# Patient Record
Sex: FEMALE | Race: BLACK OR AFRICAN AMERICAN | Employment: OTHER | URBAN - METROPOLITAN AREA
[De-identification: names, ages, dates, MRNs, and addresses within clinical notes are randomized per-mention and may not be internally consistent; named-entity substitution may affect disease eponyms.]

---

## 2020-12-07 ENCOUNTER — OFFICE VISIT (OUTPATIENT)
Dept: ORTHOPEDIC SURGERY | Age: 75
End: 2020-12-07
Payer: MEDICARE

## 2020-12-07 VITALS
DIASTOLIC BLOOD PRESSURE: 85 MMHG | WEIGHT: 200 LBS | TEMPERATURE: 97.7 F | RESPIRATION RATE: 15 BRPM | HEART RATE: 82 BPM | SYSTOLIC BLOOD PRESSURE: 170 MMHG

## 2020-12-07 DIAGNOSIS — M43.16 SPONDYLOLISTHESIS AT L4-L5 LEVEL: Primary | ICD-10-CM

## 2020-12-07 DIAGNOSIS — G95.19 NEUROGENIC CLAUDICATION (HCC): ICD-10-CM

## 2020-12-07 PROCEDURE — 99203 OFFICE O/P NEW LOW 30 MIN: CPT | Performed by: PHYSICAL MEDICINE & REHABILITATION

## 2020-12-07 PROCEDURE — G8536 NO DOC ELDER MAL SCRN: HCPCS | Performed by: PHYSICAL MEDICINE & REHABILITATION

## 2020-12-07 PROCEDURE — 72110 X-RAY EXAM L-2 SPINE 4/>VWS: CPT | Performed by: PHYSICAL MEDICINE & REHABILITATION

## 2020-12-07 PROCEDURE — G8427 DOCREV CUR MEDS BY ELIG CLIN: HCPCS | Performed by: PHYSICAL MEDICINE & REHABILITATION

## 2020-12-07 PROCEDURE — G8421 BMI NOT CALCULATED: HCPCS | Performed by: PHYSICAL MEDICINE & REHABILITATION

## 2020-12-07 PROCEDURE — G8399 PT W/DXA RESULTS DOCUMENT: HCPCS | Performed by: PHYSICAL MEDICINE & REHABILITATION

## 2020-12-07 PROCEDURE — G8510 SCR DEP NEG, NO PLAN REQD: HCPCS | Performed by: PHYSICAL MEDICINE & REHABILITATION

## 2020-12-07 PROCEDURE — 1090F PRES/ABSN URINE INCON ASSESS: CPT | Performed by: PHYSICAL MEDICINE & REHABILITATION

## 2020-12-07 PROCEDURE — 1101F PT FALLS ASSESS-DOCD LE1/YR: CPT | Performed by: PHYSICAL MEDICINE & REHABILITATION

## 2020-12-07 RX ORDER — TELMISARTAN AND HYDROCHLORTHIAZIDE 80; 12.5 MG/1; MG/1
TABLET ORAL
COMMUNITY
Start: 2020-09-26

## 2020-12-07 RX ORDER — TRAMADOL HYDROCHLORIDE 50 MG/1
TABLET ORAL
COMMUNITY
Start: 2020-11-28 | End: 2021-06-15

## 2020-12-07 RX ORDER — ERGOCALCIFEROL 1.25 MG/1
CAPSULE ORAL
COMMUNITY
Start: 2020-09-09

## 2020-12-07 RX ORDER — ROSUVASTATIN CALCIUM 5 MG/1
5 TABLET, COATED ORAL
COMMUNITY
Start: 2020-12-04

## 2020-12-07 RX ORDER — GABAPENTIN 300 MG/1
CAPSULE ORAL
COMMUNITY
Start: 2020-12-02 | End: 2020-12-07 | Stop reason: DRUGHIGH

## 2020-12-07 RX ORDER — GABAPENTIN 400 MG/1
CAPSULE ORAL
Qty: 90 CAP | Refills: 2 | Status: SHIPPED | OUTPATIENT
Start: 2020-12-07 | End: 2021-03-15 | Stop reason: SDUPTHER

## 2020-12-07 RX ORDER — CELECOXIB 100 MG/1
CAPSULE ORAL
COMMUNITY
Start: 2020-10-20 | End: 2021-06-15

## 2020-12-07 NOTE — PROGRESS NOTES
[address]  [address], [address]  [place] [address]  Phone: [phone]  Fax: [phone]        Adriana Bergman  : 1945  PCP: Leticia Jeff MD      NEW PATIENT      ASSESSMENT AND PLAN     Diagnoses and all orders for this visit:    1. Spondylolisthesis at L4-L5 level  -     AMB POC XRAY, SPINE, LUMBOSACRAL; 4+  -     AMB SUPPLY ORDER  -     gabapentin (NEURONTIN) 400 mg capsule; 1 cap po Q am and 2 cap po Q HS    2. Neurogenic claudication  -     AMB SUPPLY ORDER  -     gabapentin (NEURONTIN) 400 mg capsule; 1 cap po Q am and 2 cap po Q HS      1. 66 y.o. female with a spondylolisthesis and progressive ambulatory dysfunction due to probable lumbar stenosis. 2. Advised to stay active as tolerated. 3. Discussed life style modification, PT, medication, spinal injection, and surgery as treatment options  4. Rx for back brace, intermittent use for flares and prolonged ambulation. 5. Increased Gabapentin to 400/800 mg  6. Given information on back care basics and spondylolisthesis    Patient was prescribed a back brace which is medically necessary for the above diagnoses. This orthosis is required for the following reason(s):   1. To reduce pain by restrictions mobility of the trunk - YES,           Follow-up and Dispositions    · Return in 1 month (on 2021). CHIEF COMPLAINT  Hiwot Juarez is seen today in consultation for complaints of back and B/L buttock pain       HISTORY OF PRESENT ILLNESS  Hiwot Juarez is a 66 y.o. female. Today pt c/o back and B/L buttock pain since 2018. Pt denies any specific incident or injury that caused their pain. Pt states that in 2018, she was told she had arthritis, and because she didn't want surgery, she was given Gabapentin and Tramadol. Notes that around that time, her grandson graduated from college, and she over exerted herself. She believes this caused the flare.  Walking tolerance with a shopping cart is manageable. Standing tolerance is now 10 minutes. Pain Assessment  12/7/2020   Location of Pain Back   Severity of Pain 6   Quality of Pain Aching   Frequency of Pain Intermittent   Aggravating Factors Standing;Walking   Aggravating Factors Comment lifting   Relieving Factors Heat;Rest;Exercises     Does pain radiate into extremities: B/L buttock  Does patient have numbness/tingling: No  Does patient have weakness: No   Pt denies saddle paresthesias. Denies persistent fevers, chills, weight changes, neurogenic bowel or bladder symptoms. Treatments patient has tried:  Physical therapy:Yes 2018 w/ benefit  Doing HEP: Yes  Beneficial medications: Celebrex, Gabapentin 300/600 mg, Tramadol 50 mg as needed  Failed medications: Unknown  Spinal injections: No    Spinal surgery- No.   Spine surgery consult: No.     L XR 12/2020 (in-office): grade 1-2 listhesis at L4-5, DDD at L5-S1     reviewed. PMHx of HTN. Used to work in accounting. PAST MEDICAL HISTORY   Past Medical History:   Diagnosis Date    Hypertension        Past Surgical History:   Procedure Laterality Date    HX TUBAL LIGATION         MEDICATIONS        Controlled Substance Monitoring:    No flowsheet data found.      ALLERGIES  Not on File       SOCIAL HISTORY    Social History     Socioeconomic History    Marital status:      Spouse name: Not on file    Number of children: Not on file    Years of education: Not on file    Highest education level: Not on file   Occupational History    Not on file   Social Needs    Financial resource strain: Not on file    Food insecurity     Worry: Not on file     Inability: Not on file    Transportation needs     Medical: Not on file     Non-medical: Not on file   Tobacco Use    Smoking status: Never Smoker    Smokeless tobacco: Never Used   Substance and Sexual Activity    Alcohol use: Not on file    Drug use: Not on file    Sexual activity: Not on file   Lifestyle  Physical activity     Days per week: Not on file     Minutes per session: Not on file    Stress: Not on file   Relationships    Social connections     Talks on phone: Not on file     Gets together: Not on file     Attends Latter day service: Not on file     Active member of club or organization: Not on file     Attends meetings of clubs or organizations: Not on file     Relationship status: Not on file    Intimate partner violence     Fear of current or ex partner: Not on file     Emotionally abused: Not on file     Physically abused: Not on file     Forced sexual activity: Not on file   Other Topics Concern    Not on file   Social History Narrative    Not on file       FAMILY HISTORY  History reviewed. No pertinent family history. REVIEW OF SYSTEMS  Review of Systems   Constitutional: Negative for chills, fever and weight loss. Respiratory: Negative for shortness of breath. Cardiovascular: Negative for chest pain. Gastrointestinal: Negative for constipation. Negative for fecal incontinence   Genitourinary: Negative for dysuria. Negative for urinary incontinence   Musculoskeletal: Positive for back pain. Per HPI   Skin: Negative for rash. Neurological: Negative for dizziness, tingling, tremors, focal weakness and headaches. Endo/Heme/Allergies: Does not bruise/bleed easily. Psychiatric/Behavioral: The patient does not have insomnia. PHYSICAL EXAMINATION  Visit Vitals  BP (!) 170/85   Pulse 82   Temp 97.7 °F (36.5 °C) (Temporal)   Resp 15   Wt 200 lb (90.7 kg)          Accompanied by self. Constitutional:  Well developed, well nourished, in no acute distress. Psychiatric: Affect and mood are appropriate. Integumentary: No rashes or abrasions noted on exposed areas. Cardiovascular/Peripheral Vascular: No peripheral edema is noted BLE. SPINE/MUSCULOSKELETAL EXAM    Lumbar spine:  No rash, ecchymosis, or gross obliquity. No fasciculations.  No focal atrophy is noted. Tenderness to palpation L4-5, B/L sciatic notch. SI joints non-tender. Trochanters non tender. MOTOR:       Hip Flex  Quads Hamstrings Ankle DF EHL Ankle PF   Right +4/5 +4/5 +4/5 +4/5 +4/5 +4/5   Left +4/5 +4/5 +4/5 +4/5 +4/5 +4/5     Straight Leg raise negative. Ambulation without assistive device. FWB. RADIOGRAPHS  L xray films reviewed:  1) grade 1-2 listhesis at L4-5  2) DDD L5-S1    Written by Marisol Echeverria, as dictated by Kai Villegas MD.    I, Dr. Kai Villegas MD, confirm that all documentation is accurate. Ms. Marianne Cruz may have a reminder for a \"due or due soon\" health maintenance. I have asked that she contact her primary care provider for follow-up on this health maintenance.

## 2020-12-07 NOTE — PATIENT INSTRUCTIONS
Learning About How to Have a Healthy Back  What causes back pain? Back pain is often caused by overuse, strain, or injury. For example, people often hurt their backs playing sports or working in the yard, being jolted in a car accident, or lifting something too heavy. Aging plays a part too. Your bones and muscles tend to lose strength as you age, which makes injury more likely. The spongy discs between the bones of the spine (vertebrae) may suffer from wear and tear and no longer provide enough cushion between the bones. A disc that bulges or breaks open (herniated disc) can press on nerves, causing back pain. In some people, back pain is the result of arthritis, broken vertebrae caused by bone loss (osteoporosis), illness, or a spine problem. Although most people have back pain at one time or another, there are steps you can take to make it less likely. How can you have a healthy back? Reduce stress on your back through good posture   Slumping or slouching alone may not cause low back pain. But after the back has been strained or injured, bad posture can make pain worse. · Sleep in a position that maintains your back's normal curves and on a mattress that feels comfortable. Sleep on your side with a pillow between your knees, or sleep on your back with a pillow under your knees. These positions can reduce strain on your back. · Stand and sit up straight. \"Good posture\" generally means your ears, shoulders, and hips are in a straight line. · If you must stand for a long time, put one foot on a stool, ledge, or box. Switch feet every now and then. · Sit in a chair that is low enough to let you place both feet flat on the floor with both knees nearly level with your hips. If your chair or desk is too high, use a footrest to raise your knees. Place a small pillow, a rolled-up towel, or a lumbar roll in the curve of your back if you need extra support.   · Try a kneeling chair, which helps tilt your hips forward. This takes pressure off your lower back. · Try sitting on an exercise ball. It can rock from side to side, which helps keep your back loose. · When driving, keep your knees nearly level with your hips. Sit straight, and drive with both hands on the steering wheel. Your arms should be in a slightly bent position. Reduce stress on your back through careful lifting   · Squat down, bending at the hips and knees only. If you need to, put one knee to the floor and extend your other knee in front of you, bent at a right angle (half kneeling). · Press your chest straight forward. This helps keep your upper back straight while keeping a slight arch in your low back. · Hold the load as close to your body as possible, at the level of your belly button (navel). · Use your feet to change direction, taking small steps. · Lead with your hips as you change direction. Keep your shoulders in line with your hips as you move. · Set down your load carefully, squatting with your knees and hips only. Exercise and stretch your back   · Do some exercise on most days of the week, if your doctor says it is okay. You can walk, run, swim, or cycle. · Stretch your back muscles. Here are a few exercises to try:  ? Lie on your back, and gently pull one bent knee to your chest. Put that foot back on the floor, and then pull the other knee to your chest.  ? Do pelvic tilts. Lie on your back with your knees bent. Tighten your stomach muscles. Pull your belly button (navel) in and up toward your ribs. You should feel like your back is pressing to the floor and your hips and pelvis are slightly lifting off the floor. Hold for 6 seconds while breathing smoothly. ? Sit with your back flat against a wall. · Keep your core muscles strong. The muscles of your back, belly (abdomen), and buttocks support your spine. ? Pull in your belly and imagine pulling your navel toward your spine. Hold this for 6 seconds, then relax.  Remember to keep breathing normally as you tense your muscles. ? Do curl-ups. Always do them with your knees bent. Keep your low back on the floor, and curl your shoulders toward your knees using a smooth, slow motion. Keep your arms folded across your chest. If this bothers your neck, try putting your hands behind your neck (not your head), with your elbows spread apart. ? Lie on your back with your knees bent and your feet flat on the floor. Tighten your belly muscles, and then push with your feet and raise your buttocks up a few inches. Hold this position 6 seconds as you continue to breathe normally, then lower yourself slowly to the floor. Repeat 8 to 12 times. ? If you like group exercise, try Pilates or yoga. These classes have poses that strengthen the core muscles. Lead a healthy lifestyle   · Stay at a healthy weight to avoid strain on your back. · Do not smoke. Smoking increases the risk of osteoporosis, which weakens the spine. If you need help quitting, talk to your doctor about stop-smoking programs and medicines. These can increase your chances of quitting for good. Where can you learn more? Go to http://www.east.com/  Enter L315 in the search box to learn more about \"Learning About How to Have a Healthy Back. \"  Current as of: March 2, 2020               Content Version: 12.6  © 1920-2732 [employer]. Care instructions adapted under license by [employer] (which disclaims liability or warranty for this information). If you have questions about a medical condition or this instruction, always ask your healthcare professional. [place] [address] any warranty or liability for your use of this information. Spondylolysis and Spondylolisthesis: Exercises  Introduction  Here are some examples of exercises for you to try. The exercises may be suggested for a condition or for rehabilitation. Start each exercise slowly.  Ease off the exercises if you start to have pain. You will be told when to start these exercises and which ones will work best for you. How to do the exercises  Single knee-to-chest   1. Lie on your back with your knees bent and your feet flat on the floor. You can put a small pillow under your head and neck if it is more comfortable. 2. Bring one knee to your chest, keeping the other foot flat on the floor. 3. Keep your lower back pressed to the floor. Hold for 15 to 30 seconds. 4. Relax, and lower the knee to the starting position. 5. Repeat with the other leg. Repeat 2 to 4 times with each leg. 6. To get more stretch, put your other leg flat on the floor while pulling your knee to your chest.    Double knee-to-chest   1. Lie on your back with your knees bent and your feet flat on the floor. You can put a small pillow under your head and neck if it is more comfortable. 2. Bring both knees to your chest.  3. Keep your lower back pressed to the floor. Hold for 15 to 30 seconds. 4. Relax, and lower your knees to the starting position. 5. Repeat 2 to 4 times. Alternate arm and leg (bird dog) exercise   Do this exercise slowly. Try to keep your body straight at all times. 1. Start on the floor, on your hands and knees. 2. Tighten your belly muscles by pulling your belly button in toward your spine. Be sure you continue to breathe normally and do not hold your breath. 3. Raise one arm off the floor, and hold it straight out in front of you. Be careful not to let your shoulder drop down, because that will twist your trunk. 4. Hold for about 6 seconds, then lower your arm and switch to your other arm. 5. Repeat 8 to 12 times on each arm. 6. When you can do this exercise with ease and no pain, repeat steps 1 through 5. But this time do it with one leg raised off the floor, holding your leg straight out behind you. Be careful not to let your hip drop down, because that will twist your trunk.   7. When holding your leg straight out becomes easier, try raising your opposite arm at the same time, and repeat steps 1 through 5. Bridging   1. Lie on your back with both knees bent. Your knees should be bent about 90 degrees. 2. Then push your feet into the floor, squeeze your buttocks, and lift your hips off the floor until your shoulders, hips, and knees are all in a straight line. 3. Hold for about 6 seconds as you continue to breathe normally, and then slowly lower your hips back down to the floor and rest for up to 10 seconds. 4. Repeat 8 to 12 times. Curl-ups   1. Lie on the floor on your back with your knees bent at a 90-degree angle. Your feet should be flat on the floor, about 12 inches from your buttocks. 2. Cross your arms over your chest. If this bothers your neck, try putting your hands behind your neck (not your head), with your elbows spread apart. 3. Slowly tighten your belly muscles and raise your shoulder blades off the floor. 4. Keep your head in line with your body, and do not press your chin to your chest.  5. Hold this position for 1 or 2 seconds, then slowly lower yourself back down to the floor. 6. Repeat 8 to 12 times. Plank   Do this exercise slowly. Try to keep your body straight at all times, and do not let one hip drop lower than the other. 1. Lie on your stomach, resting your upper body on your forearms. 2. Tighten your belly muscles by pulling your belly button in toward your spine. 3. Keeping your knees on the floor, press down with your forearms to lift your upper body off the floor. 4. Hold for about 6 seconds, then lower your body to the floor. Rest for up to 10 seconds. 5. Repeat 8 to 12 times. 6. Over time, work up to holding for 15 to 30 seconds each time. 7. If this exercise is easy to do with your knees on the floor, try doing this exercise with your knees and legs straight, supported by your toes on the floor. Follow-up care is a key part of your treatment and safety.  Be sure to make and go to all appointments, and call your doctor if you are having problems. It's also a good idea to know your test results and keep a list of the medicines you take. Where can you learn more? Go to http://www.east.com/  Enter M245 in the search box to learn more about \"Spondylolysis and Spondylolisthesis: Exercises. \"  Current as of: March 2, 2020               Content Version: 12.6  © 2006-2020 [employer]. Care instructions adapted under license by [employer] (which disclaims liability or warranty for this information). If you have questions about a medical condition or this instruction, always ask your healthcare professional. [address] any warranty or liability for your use of this information.

## 2021-03-11 DIAGNOSIS — G95.19 NEUROGENIC CLAUDICATION (HCC): ICD-10-CM

## 2021-03-11 DIAGNOSIS — M43.16 SPONDYLOLISTHESIS AT L4-L5 LEVEL: ICD-10-CM

## 2021-03-11 RX ORDER — GABAPENTIN 400 MG/1
CAPSULE ORAL
Qty: 90 CAP | Refills: 2 | OUTPATIENT
Start: 2021-03-11

## 2021-03-11 NOTE — TELEPHONE ENCOUNTER
Last Visit: 20 with MD Tam Quick  Next Appointment: Advised to follow-up in 1 month  Previous Refill Encounter(s): 20 #90 with 2 refills    Requested Prescriptions     Pending Prescriptions Disp Refills    gabapentin (NEURONTIN) 400 mg capsule 90 Cap 2     Si cap po Q am and 2 cap po Q HS

## 2021-03-15 ENCOUNTER — OFFICE VISIT (OUTPATIENT)
Dept: ORTHOPEDIC SURGERY | Age: 76
End: 2021-03-15
Payer: MEDICARE

## 2021-03-15 VITALS
HEART RATE: 95 BPM | HEIGHT: 65 IN | WEIGHT: 202 LBS | DIASTOLIC BLOOD PRESSURE: 77 MMHG | OXYGEN SATURATION: 98 % | BODY MASS INDEX: 33.66 KG/M2 | TEMPERATURE: 98.8 F | SYSTOLIC BLOOD PRESSURE: 155 MMHG

## 2021-03-15 DIAGNOSIS — M43.16 SPONDYLOLISTHESIS AT L4-L5 LEVEL: ICD-10-CM

## 2021-03-15 DIAGNOSIS — G95.19 NEUROGENIC CLAUDICATION (HCC): ICD-10-CM

## 2021-03-15 PROCEDURE — G8427 DOCREV CUR MEDS BY ELIG CLIN: HCPCS | Performed by: NURSE PRACTITIONER

## 2021-03-15 PROCEDURE — 99213 OFFICE O/P EST LOW 20 MIN: CPT | Performed by: NURSE PRACTITIONER

## 2021-03-15 PROCEDURE — G8536 NO DOC ELDER MAL SCRN: HCPCS | Performed by: NURSE PRACTITIONER

## 2021-03-15 PROCEDURE — G8399 PT W/DXA RESULTS DOCUMENT: HCPCS | Performed by: NURSE PRACTITIONER

## 2021-03-15 PROCEDURE — G8432 DEP SCR NOT DOC, RNG: HCPCS | Performed by: NURSE PRACTITIONER

## 2021-03-15 PROCEDURE — G8417 CALC BMI ABV UP PARAM F/U: HCPCS | Performed by: NURSE PRACTITIONER

## 2021-03-15 PROCEDURE — 1101F PT FALLS ASSESS-DOCD LE1/YR: CPT | Performed by: NURSE PRACTITIONER

## 2021-03-15 PROCEDURE — 1090F PRES/ABSN URINE INCON ASSESS: CPT | Performed by: NURSE PRACTITIONER

## 2021-03-15 RX ORDER — GABAPENTIN 400 MG/1
CAPSULE ORAL
Qty: 90 CAP | Refills: 2 | Status: SHIPPED | OUTPATIENT
Start: 2021-03-15 | End: 2021-06-15 | Stop reason: SDUPTHER

## 2021-03-15 NOTE — PROGRESS NOTES
Chief complaint   Chief Complaint   Patient presents with    Back Pain    Medication Refill       History of Present Illness: Bennie Shirley is a  66 y.o.  female who comes in today for follow-up of her chronic low back pain. She states she does get some bilateral buttock pain and that gabapentin does help with it. Dr. Damian Esquivel put her on 400 in the morning and 800 at night which was working well for her. She states her PCP change it to 300 mg three times a day but then will not refill it as he has referred her to pain management. She does have a pain management appointment this Thursday. She is not quite sure who it is with. The PCP also gives her small amounts of tramadol. She states she would like to go back on the gabapentin 400 in the morning and 800 mg at nighttime as that works better for her. She denies fever bowel bladder dysfunction. She does have a known spondylolisthesis at L4-5. Dr. Jackelyn Estrella also gave her a back brace that she uses when she does heavy cleaning and that does seem to help with her back pain. She denies fever bowel bladder dysfunction. Physical Exam: Patient is a 64-year-old female well-developed well-nourished who is alert and oriented with a normal mood and affect. She has a full weightbearing slow but nonantalgic gait. She did not use any assistive device. She has 4 out of 5 strength bilateral lower extremities. Negative straight leg raise. She has pain with hyperextension lumbar spine. Assessment and Plan: This patient with a spondylolisthesis at L4-5 that gives her chronic back and buttock pain. I am going to put her back on the gabapentin 400 in the morning and 800 at night. We will see her back in 3-month sooner if needed.         Review of systems:    Past Medical History:   Diagnosis Date    Hypertension      Past Surgical History:   Procedure Laterality Date    HX TUBAL LIGATION       Social History     Socioeconomic History    Marital status:      Spouse name: Not on file    Number of children: Not on file    Years of education: Not on file    Highest education level: Not on file   Occupational History    Not on file   Social Needs    Financial resource strain: Not on file    Food insecurity     Worry: Not on file     Inability: Not on file    Transportation needs     Medical: Not on file     Non-medical: Not on file   Tobacco Use    Smoking status: Never Smoker    Smokeless tobacco: Never Used   Substance and Sexual Activity    Alcohol use: Not on file    Drug use: Not on file    Sexual activity: Not on file   Lifestyle    Physical activity     Days per week: Not on file     Minutes per session: Not on file    Stress: Not on file   Relationships    Social connections     Talks on phone: Not on file     Gets together: Not on file     Attends Mandaeism service: Not on file     Active member of club or organization: Not on file     Attends meetings of clubs or organizations: Not on file     Relationship status: Not on file    Intimate partner violence     Fear of current or ex partner: Not on file     Emotionally abused: Not on file     Physically abused: Not on file     Forced sexual activity: Not on file   Other Topics Concern    Not on file   Social History Narrative    Not on file     No family history on file. Physical Exam:  Visit Vitals  BP (!) 155/77 (BP 1 Location: Left upper arm, BP Patient Position: Sitting, BP Cuff Size: Adult long)   Pulse 95   Temp 98.8 °F (37.1 °C) (Skin)   Ht 5' 5\" (1.651 m)   Wt 202 lb (91.6 kg)   SpO2 98%   BMI 33.61 kg/m²     Pain Scale: 5/10       has been . reviewed and is appropriate          Diagnoses and all orders for this visit:    1. Spondylolisthesis at L4-L5 level  -     gabapentin (NEURONTIN) 400 mg capsule; 1 cap po Q am and 2 cap po Q HS    2. Neurogenic claudication            Follow-up and Dispositions    · Return in about 3 months (around 6/15/2021) for with NP Larissa.              We have informed [facility] to notify us for immediate appointment if she has any worsening neurogical symptoms or if an emergency situation presents, then call [phone]

## 2021-06-15 ENCOUNTER — OFFICE VISIT (OUTPATIENT)
Dept: ORTHOPEDIC SURGERY | Age: 76
End: 2021-06-15
Payer: MEDICARE

## 2021-06-15 VITALS
BODY MASS INDEX: 34.32 KG/M2 | DIASTOLIC BLOOD PRESSURE: 72 MMHG | TEMPERATURE: 96.9 F | SYSTOLIC BLOOD PRESSURE: 145 MMHG | HEART RATE: 70 BPM | OXYGEN SATURATION: 99 % | WEIGHT: 206 LBS | HEIGHT: 65 IN

## 2021-06-15 DIAGNOSIS — M54.41 CHRONIC BILATERAL LOW BACK PAIN WITH BILATERAL SCIATICA: ICD-10-CM

## 2021-06-15 DIAGNOSIS — M54.42 CHRONIC BILATERAL LOW BACK PAIN WITH BILATERAL SCIATICA: ICD-10-CM

## 2021-06-15 DIAGNOSIS — M25.562 CHRONIC PAIN OF BOTH KNEES: ICD-10-CM

## 2021-06-15 DIAGNOSIS — Z51.81 ENCOUNTER FOR THERAPEUTIC DRUG MONITORING: ICD-10-CM

## 2021-06-15 DIAGNOSIS — G89.29 CHRONIC BILATERAL LOW BACK PAIN WITH BILATERAL SCIATICA: ICD-10-CM

## 2021-06-15 DIAGNOSIS — M43.16 SPONDYLOLISTHESIS AT L4-L5 LEVEL: Primary | ICD-10-CM

## 2021-06-15 DIAGNOSIS — G89.29 CHRONIC PAIN OF BOTH KNEES: ICD-10-CM

## 2021-06-15 DIAGNOSIS — M25.561 CHRONIC PAIN OF BOTH KNEES: ICD-10-CM

## 2021-06-15 DIAGNOSIS — M43.16 SPONDYLOLISTHESIS AT L4-L5 LEVEL: ICD-10-CM

## 2021-06-15 PROCEDURE — 1090F PRES/ABSN URINE INCON ASSESS: CPT | Performed by: NURSE PRACTITIONER

## 2021-06-15 PROCEDURE — G8399 PT W/DXA RESULTS DOCUMENT: HCPCS | Performed by: NURSE PRACTITIONER

## 2021-06-15 PROCEDURE — G8432 DEP SCR NOT DOC, RNG: HCPCS | Performed by: NURSE PRACTITIONER

## 2021-06-15 PROCEDURE — G8417 CALC BMI ABV UP PARAM F/U: HCPCS | Performed by: NURSE PRACTITIONER

## 2021-06-15 PROCEDURE — G8536 NO DOC ELDER MAL SCRN: HCPCS | Performed by: NURSE PRACTITIONER

## 2021-06-15 PROCEDURE — 1101F PT FALLS ASSESS-DOCD LE1/YR: CPT | Performed by: NURSE PRACTITIONER

## 2021-06-15 PROCEDURE — G8427 DOCREV CUR MEDS BY ELIG CLIN: HCPCS | Performed by: NURSE PRACTITIONER

## 2021-06-15 PROCEDURE — 99213 OFFICE O/P EST LOW 20 MIN: CPT | Performed by: NURSE PRACTITIONER

## 2021-06-15 RX ORDER — GABAPENTIN 400 MG/1
CAPSULE ORAL
Qty: 90 CAPSULE | Refills: 2 | Status: SHIPPED | OUTPATIENT
Start: 2021-06-15 | End: 2021-09-15 | Stop reason: SDUPTHER

## 2021-06-15 RX ORDER — AMLODIPINE BESYLATE 5 MG/1
TABLET ORAL
COMMUNITY
Start: 2021-05-28 | End: 2022-05-03 | Stop reason: ALTCHOICE

## 2021-06-15 RX ORDER — SITAGLIPTIN AND METFORMIN HYDROCHLORIDE 50; 500 MG/1; MG/1
TABLET, FILM COATED, EXTENDED RELEASE ORAL
COMMUNITY
End: 2022-01-31

## 2021-06-15 RX ORDER — TRAMADOL HYDROCHLORIDE 50 MG/1
50 TABLET ORAL
Qty: 30 TABLET | Refills: 0 | Status: SHIPPED | OUTPATIENT
Start: 2021-06-15 | End: 2021-07-19

## 2021-06-15 NOTE — LETTER
Name:.Lissette Ellis CSA:[birth year] MR #:[id number] Office:[facility] Page 1 of 5 CONTROLLED SUBSTANCE AGREEMENT I may be prescribed medications that are controlled substances as part  of my treatment plan for management of my medical condition(s). The goal of my treatment plan is to maintain and/or improve my health and wellbeing. Because controlled substances have an increased risk of abuse or harm, continual re-evaluation is needed determine if the goals of my treatment plan are being met for my safety and the safety of others. Macario Kendrick  am entering into this Controlled Substance Agreement with my provider, Sachi King, NP__at [facility] . I understand that successful treatment requires mutual trust and honesty between me and my provider. I understand that there are state and federal laws and regulations which apply to the medications that my provider may prescribe that must be followed. I understand there are risks and benefits ts of taking the medicines that my provider may prescribe. I understand and agree that following this Agreement is necessary in continuing my provider-patient relationship and success of my treatment plan. As a part of my treatment plan, I agree to the following: COMMUNICATION: 
 
1. I will communicate fully with my provider about my medical condition(s), including the effect on my daily life and how well my medications are helping. I will tell my provider all of the medications that I take for any reason, including medications I receive from another health care provider, and will notify my provider about all issues, problems or concerns, including any side effects, which may be related to my medications. I understand that this information allows my provider to adjust my treatment plan to help manage my medical condition.  I understand that this information will become part of my permanent medical record. 2. I will notify my provider if I have a history of alcohol/drug misuse/addiction or if I have had treatment for alcohol/drug addiction in the past, or if I have a new problem with or concern about alcohol/drug use/addiction, because this increases the likelihood of high risk behaviors and may lead to serious medical conditions. 3. Females Only: I will notify my provider if I am or become pregnant, or if I intend to become pregnant, or if I intend to breastfeed. I understand that communication of these issues with my provider is important, due to possible effects my medication could have on an unborn fetus or breastfeeding child. Initials_____ Name:Leilani Rosas HRE:[birth year] MR #:[id number] Office:[facility] Page 2 of 5 MISUSE OF MEDICATIONS / DRUGS: 
 
1. I agree to take all controlled substances as prescribed, and will not misuse or abuse any controlled substances prescribed by my provider. For my safety, I will not increase the amount of medicine I take without first talking with and getting permission from my provider. 2. If I have a medical emergency, another health care provider may prescribe me medication. If I seek emergency treatment, I will notify my provider within seventy-two (72) hours. 3. I understand that my provider may discuss my use and/or possible misuse/abuse of controlled substances and alcohol, as appropriate, with any health care provider involved in my care, pharmacist or legal authority. ILLEGAL DRUGS: 
 
1. I will not use illegal drugs of any kind, including but not limited to marijuana, heroin, cocaine, or any prescription drug which is not prescribed to me. DRUG DIVERSION / PRESCRIPTION FRAUD: 
 
1. I will not share, sell, trade, give away, or otherwise misuse my prescriptions or medications. 2. I will not alter any prescriptions provided to me by my provider. SINGLE PROVIDER: 
 
1. I agree that all controlled substances that I take will be prescribed only by my provider (or his/her covering provider) under this Agreement. This agreement does not prevent me from seeking emergency medical treatment or receiving pain management related to a surgery. PROTECTING MEDICATIONS: 
 
1. I am responsible for keeping my prescriptions and medications in a safe and secure place including safeguarding them from loss or theft. I understand that lost, stolen or damaged/destroyed prescriptions or medications will not be replaced. Initials____ Name:Leilani Tim JOT:0[birth year] MR #:[id number] Office:[facility] Page 3 of 5 PRESCRIPTION RENEWALS/REFILLS: 
 
1. I will follow my controlled substance medication schedule as prescribed by my provider. 2. I understand and agree that I will make any requests for renewals or refills of my prescriptions only at the time of an office visit or during my providers regular office hours subject to the prescription refill requirements of the individual practice. 3. I understand that my provider may not call in prescriptions for controlled substances to my pharmacy. 4. I understand that my provider may adjust or discontinue these medications as deemed appropriate for my medical treatment plan. This Agreement does not guarantee the prescription of controlled medications. 5. I agree that if my medications are adjusted or discontinued, I will properly dispose of any remaining medications. I understand that I will be required to dispose of any remaining controlled medications prior to being provided with any prescriptions for other controlled medications. 6. I understand that the renewal of my prescription depends on my medical condition, my consistent participation, and my adherence with my treatment plan and this Agreement.  
 
7. I understand that if I do not keep an appointment with my provider, I may not receive a renewal or refill for my controlled substance medication. PRESCRIPTION MONITORING / DRUG TESTIN. I understand that my provider may require me to provide urine, saliva or blood for testing at any time. I understand that this testing will be used to monitor for safety and adherence with my treatment plan and this Agreement. 2. I understand that my provider may ask me to provide an observed urine specimen, which means that a nurse or other health care provider may watch me provide urine, and I agree to cooperate if I am asked to provide an observed specimen. 3. I understand that if I do not provide urine, saliva or blood samples within two (2) hours of my providers request, or other timeframe decided by my provider, my treatment plan could be changed, or my prescriptions and medications may be changed or ended. 4. I understand that urine, saliva and blood test results will be a part of my permanent medical record. Initials_____ Name:Leilani [place]: MR #:[id number] Office:[facility] Page 4 of 5 
 
5. I understand that my provider is required to obtain a copy of my State Prescription Monitoring Program () Report at any time in order to safely prescribe medications. 6. I will bring all of my prescribed controlled substance medications in their original bottles to all of my scheduled appointments. 7. I understand that my provider may ask me to come to the practice with all of my prescribed medications for a random pill count at any time. I agree to cooperate if I am asked to come in for a random pill count. I understand that if I do not arrive in the timeframe decided by my provider, my treatment plan could be changed, or my prescriptions and medications may be changed or ended.  
 
COOPERATION WITH INVESTIGATIONS: 
 
1. I authorize my provider and my pharmacy to cooperate fully with any local, state, or federal law enforcement agency in the investigation of any possible misuse, sale, or other diversion of my controlled substance prescriptions or medications. RISKS: 
 
1. I understand that my level of consciousness may be affected from the use of controlled substances, and I understand that there are risks, benefits, effects and potential alternatives (including no treatment) to the medications that my provider has prescribed. 2. I understand that I may become drowsy, tired, dizzy, constipated, and sick to my stomach, or have changes in my mood or in my sleep while taking my medications. I have talked with my provider about these possible side effects, risks, benefits, and alternative treatments, and my provider has answered all of my questions. 3. I understand that I should not suddenly stop taking my medications without first speaking with my provider. I understand that if I suddenly stop taking my medications, I may experience nausea, vomiting, sweating,anxiety, sleeplessness, itching or other uncomfortable feelings. 4. I will not take my medications with alcohol of any kind, including beer, wine or liquor. I understand that drinking alcohol with my medications increases the chances of side effects, including breathing problems or even death. 5. I understand that if I have a history of alcoholism or other drug addiction I may be at increased risk of addiction to my medications. Signs of addiction might include craving, compulsive use, and continued use despite harm. Since addiction is a disease, I understand my provider may decide to change my medications and refer me to appropriate treatment services. I understand that this information would become part of my permanent medical record. Initials_____ Name:Leilani Peterson VLW:[birth year] MR #:[id number] Office:[facility] Page 5 of 5  
 
 
6.  Females only: Children born to women who regularly take controlled substances are likely to have physical problems and suffer withdrawal symptoms at birth. If I am of child-bearing age, I understand that I should use safe and effective birth control while taking any controlled substances to avoid the impact of medications on an unborn fetus or  child. I agree to notify my provider immediately if I should become pregnant so that my treatment plan can be adjusted. 7. Males only: I understand that chronic use of controlled substances has been associated with low testosterone levels in males which may affect my mood, stamina, sexual desire, and general health. I understand that my provider may order the appropriate laboratory test to determine my testosterone level,and I agree to this testing. ADHERENCE: 
 
1. I understand that if I do not adhere to this Agreement in any way, my provider may change my prescriptions, stop prescribing controlled substances or end our provider-patient relationship. 2. If my provider decides to stop prescribing medication, or decides to end our provider-patient relationship,my provider may require that I taper my medications slowly. If necessary, my provider may also provide a prescription for other medications to treat my withdrawal symptoms. UNDERSTANDING THIS AGREEMENT: 
 
I understand that my provider may adjust or stop my prescriptions for controlled substances based on my medical condition and my treatment plan. I understand that this Agreement does not guarantee that I will be prescribed medications or controlled substances. I understand that controlled substances may be just one part 
of my treatment plan. My initial on each page and my signature below shows that I have read each page of this Agreement, I have had an opportunity to ask questions, and all of my questions have been answered to my satisfaction by my provider.  
 
By signing below, I agree to comply with this Agreement, and I understand that if I do not follow the Agreements listed above, my provider may stop 
 
_________________________________________  Date/Time 6/15/2021 11:46 AM   
             (Patient Signature) 
 
 
 
_________________________________________ Date/Time 6/15/2021 11:46 AM 
 (Provider Signature)

## 2021-06-15 NOTE — PROGRESS NOTES
Ezra Gavin presents today for   Chief Complaint   Patient presents with    Back Pain       Is someone accompanying this pt? no    Is the patient using any DME equipment during OV? Yes, cane    Depression Screening:  3 most recent PHQ Screens 12/7/2020   Little interest or pleasure in doing things Not at all   Feeling down, depressed, irritable, or hopeless Not at all   Total Score PHQ 2 0       Fall Risk  Fall Risk Assessment, last 12 mths 12/7/2020   Able to walk? Yes   Fall in past 12 months? No       Coordination of Care:  1. Have you been to the ER, urgent care clinic since your last visit? no  Hospitalized since your last visit? no    2. Have you seen or consulted any other health care providers outside of the [address] [place] [address] since your last visit? no Include any pap smears or colon screening.  no

## 2021-06-15 NOTE — PROGRESS NOTES
Chief complaint   Chief Complaint   Patient presents with    Back Pain       History of Present Illness: Devin Hoffman is a  66 y.o.  female who comes in today for follow-up of her chronic low back pain with bilateral buttock pain. She is on gabapentin 400 in the morning and 800 at night. She states it does control her buttock pain but she still has significant back pain. She has a known spondylolisthesis at L4-5 does not wish to have any surgery. She states she states she has tried taking Tylenol but that really does not do much for her. She is allergic to NSAIDs. She also has some bilateral knee pain right greater than left that she would like a referral to orthopedic for. Physical Exam: The patient is a 77-year-old female well-developed well-nourished who is alert and oriented with a normal mood and affect. She has a full weightbearing nonantalgic gait utilizing a single-point cane. She has 4 out of 5 strength bilateral shoulders. Negative straight leg raise. She has pain with hyperextension lumbar spine. Assessment and Plan: This is a patient who has spondylolisthesis at L4-5 that gives her chronic back and buttock pain. I will give her 1 tramadol a day for her pain. She functions well on it. I did review her  and she last had it filled on February 6. She states she stayed 2 tabs for her grandsons graduation and she took them last Saturday because she wanted to be able to go to his graduation. I have refilled her gabapentin. I did explain to her that we would have to get a UDS today which she gladly submitted to. We also had her do a pain contract. We will get a ORT score next visit as she checked out before we could get that done. I do not suspect she would be high risk for overdose. Further refills of tramadol will depend on the results of the UDS. We will see her back in 3 months sooner if needed.   We will also put in a referral to Dr. Garland Nevarez for her bilateral knee pain.            Medications  Current Outpatient Medications   Medication Sig Dispense Refill    amLODIPine (NORVASC) 5 mg tablet TAKE 1 TABLET BY MOUTH EVERY DAY      SITagliptin-metFORMIN (Janumet XR)  mg TM24 Janumet XR 50 mg-500 mg tablet,extended release      gabapentin (NEURONTIN) 400 mg capsule 1 cap po Q am and 2 cap po Q HS  Indications: neuropathic pain 90 Capsule 2    traMADoL (Ultram) 50 mg tablet Take 1 Tablet by mouth daily as needed for Pain for up to 3 days. Indications: chronic pain 30 Tablet 0    ergocalciferol (ERGOCALCIFEROL) 1,250 mcg (50,000 unit) capsule TAKE 1 CAPSULE BY MOUTH ONCE WEEKLY      telmisartan-hydroCHLOROthiazide (MICARDIS HCT) 80-12.5 mg per tablet TAKE 1 TABLET BY MOUTH EVERY DAY      rosuvastatin (CRESTOR) 5 mg tablet Take 5 mg by mouth nightly. Review of systems:    Past Medical History:   Diagnosis Date    Hypertension      Past Surgical History:   Procedure Laterality Date    HX TUBAL LIGATION       Social History     Socioeconomic History    Marital status:      Spouse name: Not on file    Number of children: Not on file    Years of education: Not on file    Highest education level: Not on file   Occupational History    Not on file   Tobacco Use    Smoking status: Never Smoker    Smokeless tobacco: Never Used   Substance and Sexual Activity    Alcohol use: Not on file    Drug use: Not on file    Sexual activity: Not on file   Other Topics Concern    Not on file   Social History Narrative    Not on file     Social Determinants of Health     Financial Resource Strain:     Difficulty of Paying Living Expenses:    Food Insecurity:     Worried About Running Out of Food in the Last Year:     [address] in the Last Year:    Transportation Needs:     Lack of Transportation (Medical):      Lack of Transportation (Non-Medical):    Physical Activity:     Days of Exercise per Week:     Minutes of Exercise per Session:    Stress:  Feeling of Stress :    Social Connections:     Frequency of Communication with Friends and Family:     Frequency of Social Gatherings with Friends and Family:     Attends Faith Services:     Active Member of Clubs or Organizations:     Attends Club or Organization Meetings:     Marital Status:    Intimate Partner Violence:     Fear of Current or Ex-Partner:     Emotionally Abused:     Physically Abused:     Sexually Abused:      No family history on file. Physical Exam:  Visit Vitals  BP (!) 145/72 (BP 1 Location: Left upper arm, BP Patient Position: Sitting, BP Cuff Size: Adult long) Comment: pt asymptomatic, NP aware   Pulse 70   Temp 96.9 °F (36.1 °C) (Tympanic)   Ht 5' 5\" (1.651 m)   Wt 206 lb (93.4 kg)   SpO2 99% Comment: RA   BMI 34.28 kg/m²     Pain Scale: 5/10    Least pain over the last week has been 0-2/10. Worst pain over the last week has been 7/10. Opioid Risk Tool Reviewed: YES, Score will obtain next visit  Aberrant behaviors: None. Urine Drug Screen: today. Controlled substance agreement on file: yes.  reviewed:yes  Pill count is consistent with his prescription: n/a  Concomitant use of a benzodiazepine: no  MME 20  Narcan not warranted        Risks and benefits of ongoing opiate therapy have been reviewed with the patient. No pain behaviors. Denies thoughts of harming self or others. Pt has a good risk to benefit ratio which allows the pt to function in a home environment without side effects        has been . reviewed and is appropriate    Pt has a consistent UDS in the record      Diagnoses and all orders for this visit:    1. Spondylolisthesis at L4-L5 level  -     DRUG SCREEN UR - W/ CONFIRM; Future  -     gabapentin (NEURONTIN) 400 mg capsule; 1 cap po Q am and 2 cap po Q HS  Indications: neuropathic pain  -     traMADoL (Ultram) 50 mg tablet; Take 1 Tablet by mouth daily as needed for Pain for up to 3 days. Indications: chronic pain    2.  Chronic bilateral low back pain with bilateral sciatica  -     DRUG SCREEN UR - W/ CONFIRM; Future  -     gabapentin (NEURONTIN) 400 mg capsule; 1 cap po Q am and 2 cap po Q HS  Indications: neuropathic pain  -     traMADoL (Ultram) 50 mg tablet; Take 1 Tablet by mouth daily as needed for Pain for up to 3 days. Indications: chronic pain    3. Encounter for therapeutic drug monitoring  -     DRUG SCREEN UR - W/ CONFIRM; Future    4. Chronic pain of both knees  -     REFERRAL TO ORTHOPEDICS            Follow-up and Dispositions    · Return in about 3 months (around 9/15/2021) for with XIOMY Dias.              We have informed Juana Diaz Earnevin to notify us for immediate appointment if she has any worsening neurogical symptoms or if an emergency situation presents, then call [phone]

## 2021-07-17 DIAGNOSIS — M54.42 CHRONIC BILATERAL LOW BACK PAIN WITH BILATERAL SCIATICA: ICD-10-CM

## 2021-07-17 DIAGNOSIS — M43.16 SPONDYLOLISTHESIS AT L4-L5 LEVEL: ICD-10-CM

## 2021-07-17 DIAGNOSIS — G89.29 CHRONIC BILATERAL LOW BACK PAIN WITH BILATERAL SCIATICA: ICD-10-CM

## 2021-07-17 DIAGNOSIS — M54.41 CHRONIC BILATERAL LOW BACK PAIN WITH BILATERAL SCIATICA: ICD-10-CM

## 2021-07-19 RX ORDER — TRAMADOL HYDROCHLORIDE 50 MG/1
50 TABLET ORAL
Qty: 30 TABLET | Refills: 1 | Status: SHIPPED | OUTPATIENT
Start: 2021-07-19 | End: 2021-09-15 | Stop reason: SDUPTHER

## 2021-07-23 ENCOUNTER — OFFICE VISIT (OUTPATIENT)
Dept: ORTHOPEDIC SURGERY | Age: 76
End: 2021-07-23
Payer: MEDICARE

## 2021-07-23 VITALS
TEMPERATURE: 97.3 F | RESPIRATION RATE: 16 BRPM | WEIGHT: 200.6 LBS | HEART RATE: 88 BPM | OXYGEN SATURATION: 99 % | HEIGHT: 65 IN | BODY MASS INDEX: 33.42 KG/M2

## 2021-07-23 DIAGNOSIS — G89.29 CHRONIC PAIN OF RIGHT KNEE: ICD-10-CM

## 2021-07-23 DIAGNOSIS — M17.0 BILATERAL PRIMARY OSTEOARTHRITIS OF KNEE: Primary | ICD-10-CM

## 2021-07-23 DIAGNOSIS — M25.561 CHRONIC PAIN OF RIGHT KNEE: ICD-10-CM

## 2021-07-23 PROCEDURE — 73562 X-RAY EXAM OF KNEE 3: CPT | Performed by: ORTHOPAEDIC SURGERY

## 2021-07-23 PROCEDURE — G8427 DOCREV CUR MEDS BY ELIG CLIN: HCPCS | Performed by: ORTHOPAEDIC SURGERY

## 2021-07-23 PROCEDURE — G8417 CALC BMI ABV UP PARAM F/U: HCPCS | Performed by: ORTHOPAEDIC SURGERY

## 2021-07-23 PROCEDURE — G8536 NO DOC ELDER MAL SCRN: HCPCS | Performed by: ORTHOPAEDIC SURGERY

## 2021-07-23 PROCEDURE — 1101F PT FALLS ASSESS-DOCD LE1/YR: CPT | Performed by: ORTHOPAEDIC SURGERY

## 2021-07-23 PROCEDURE — 1090F PRES/ABSN URINE INCON ASSESS: CPT | Performed by: ORTHOPAEDIC SURGERY

## 2021-07-23 PROCEDURE — 99214 OFFICE O/P EST MOD 30 MIN: CPT | Performed by: ORTHOPAEDIC SURGERY

## 2021-07-23 PROCEDURE — G8399 PT W/DXA RESULTS DOCUMENT: HCPCS | Performed by: ORTHOPAEDIC SURGERY

## 2021-07-23 PROCEDURE — G8432 DEP SCR NOT DOC, RNG: HCPCS | Performed by: ORTHOPAEDIC SURGERY

## 2021-07-23 RX ORDER — CELECOXIB 200 MG/1
200 CAPSULE ORAL 2 TIMES DAILY
Qty: 120 CAPSULE | Refills: 2 | Status: SHIPPED | OUTPATIENT
Start: 2021-07-23 | End: 2022-01-19

## 2021-07-23 NOTE — PROGRESS NOTES
Patient: Jesus Way                MRN: [id number]       SSN: [id number]  YOB: 1945        AGE: 64 y.o. SEX: female  Body mass index is 33.38 kg/m². PCP: Mary Sims MD  07/23/21    CHIEF COMPLAINT: Bilateral knee pain  Pain 6/10    HPI: Jesus Way is a 64 y.o. female patient who presents to the office today with several years of bilateral knee pain. She says the right knee is slightly worse than left. She notices the pain when walking. She used to take Celebrex orally which did help with the knee pain but she has not taken it for the past few months. She denies any injury or trauma or surgery to either knee. Past Medical History:   Diagnosis Date    Hypertension        No family history on file. Current Outpatient Medications   Medication Sig Dispense Refill    traMADoL (ULTRAM) 50 mg tablet Take 1 Tablet by mouth daily as needed for Pain for up to 30 days. Indications: chronic pain 30 Tablet 1    amLODIPine (NORVASC) 5 mg tablet TAKE 1 TABLET BY MOUTH EVERY DAY      gabapentin (NEURONTIN) 400 mg capsule 1 cap po Q am and 2 cap po Q HS  Indications: neuropathic pain 90 Capsule 2    ergocalciferol (ERGOCALCIFEROL) 1,250 mcg (50,000 unit) capsule TAKE 1 CAPSULE BY MOUTH ONCE WEEKLY      telmisartan-hydroCHLOROthiazide (MICARDIS HCT) 80-12.5 mg per tablet TAKE 1 TABLET BY MOUTH EVERY DAY      rosuvastatin (CRESTOR) 5 mg tablet Take 5 mg by mouth nightly.       SITagliptin-metFORMIN (Janumet XR)  mg TM24 Janumet XR 50 mg-500 mg tablet,extended release (Patient not taking: Reported on 7/23/2021)         Allergies   Allergen Reactions    Ibuprofen Other (comments)     Terrible stomach pain       Past Surgical History:   Procedure Laterality Date    HX TUBAL LIGATION         Social History     Socioeconomic History    Marital status:      Spouse name: Not on file    Number of children: Not on file    Years of education: Not on file    Highest education level: Not on file   Occupational History    Not on file   Tobacco Use    Smoking status: Never Smoker    Smokeless tobacco: Never Used   Substance and Sexual Activity    Alcohol use: Not on file    Drug use: Not on file    Sexual activity: Not on file   Other Topics Concern    Not on file   Social History Narrative    Not on file     Social Determinants of Health     Financial Resource Strain:     Difficulty of Paying Living Expenses:    Food Insecurity:     Worried About Running Out of Food in the Last Year:     [address] in the Last Year:    Transportation Needs:     Lack of Transportation (Medical):  Lack of Transportation (Non-Medical):    Physical Activity:     Days of Exercise per Week:     Minutes of Exercise per Session:    Stress:     Feeling of Stress :    Social Connections:     Frequency of Communication with Friends and Family:     Frequency of Social Gatherings with Friends and Family:     Attends Voodoo Services:     Active Member of Clubs or Organizations:     Attends Club or Organization Meetings:     Marital Status:    Intimate Partner Violence:     Fear of Current or Ex-Partner:     Emotionally Abused:     Physically Abused:     Sexually Abused:        REVIEW OF SYSTEMS:      CON: negative for recent weight loss/gain, fever, or chills  EYE: negative for double or blurry vision  ENT: negative for hoarseness  RS:   negative for cough, URI, SOB  CV:  negative for chest pain, palpitations  GI:    negative for blood in stool, nausea/vomiting  :  negative for blood in urine  MS: As per HPI  Other systems reviewed and noted below. PHYSICAL EXAMINATION:  Visit Vitals  Pulse 88   Temp 97.3 °F (36.3 °C) (Temporal)   Resp 16   Ht 5' 5\" (1.651 m)   Wt 200 lb 9.6 oz (91 kg)   SpO2 99%   BMI 33.38 kg/m²     Body mass index is 33.38 kg/m². GENERAL: Alert and oriented x3, in no acute distress, well-developed, well-nourished.   HEENT: Normocephalic, atraumatic. RESP: Non labored breathing with equal chest rise on inspiration. CV: Well perfused extremities. No cyanosis or clubbing noted. ABDOMEN: Soft, non-tender, non-distended. Knee Examination      R   L  Effusion   -   -  Warmth   -   -  Erythema   -   -  ROM   Extension  5   5   Flexion   120   120  Tenderness   Medial Joint  +   +   Lateral Joint  +   +   Posterior knee  -   -  Strength   Quad   5   5   Hamstring  5   5  Crepitus   Tibiofemoral   +   +   Patellofemoral  +   +  Instability   Anterior  -   -   Posterior  -   -   Patellofemoral  -   -  Lachman's   -   -  Anterior Drawer  -   -  Posterior Drawer  -   -  Angi's   Pain   -   -   Locking  -   -  Calf TTP   -   -  Straight Leg Raise  -   -      IMAGING:  X-rays of the right knee 3 views were taken the office today. The right knee shows tricompartmental osteoarthritis which is severe with complete medial and lateral joint space collapse and a valgus deformity with osteophyte formation tricompartmentally. On the AP of the left knee which is visible on the AP of the right knee as well as in the sunrise of the left knee visible on the sunrise of the right knee she also has significant tricompartmental osteoarthritis with a valgus deformity and complete lateral joint space collapse with osteophyte patient    ASSESSMENT & PLAN  Diagnosis: Bilateral knee tricompartmental severe osteoarthritis    I discussed with Shyanne Maya the findings of her x-rays and exam today at length. We discussed the treatment options at length including operative and nonoperative management. She would like to try to restart her Celebrex medication which I was agreeable to. If this fails to give her significant relief she will return to the office for potential corticosteroid injection or discussion of further treatment. Electronically signed by: Lesley Fuentes MD    Note: This note was completed using voice recognition software.   Any typographical/name errors or mistakes are unintentional.

## 2021-07-27 ENCOUNTER — TELEPHONE (OUTPATIENT)
Dept: ORTHOPEDIC SURGERY | Age: 76
End: 2021-07-27

## 2021-07-27 NOTE — TELEPHONE ENCOUNTER
Patient called in stating the medication celecoxib (CeleBREX) 200 mg capsule needs a prior authorization.     Patient's pharmacy contact is [phone]

## 2021-09-15 ENCOUNTER — OFFICE VISIT (OUTPATIENT)
Dept: ORTHOPEDIC SURGERY | Age: 76
End: 2021-09-15
Payer: MEDICARE

## 2021-09-15 VITALS
WEIGHT: 196 LBS | SYSTOLIC BLOOD PRESSURE: 144 MMHG | BODY MASS INDEX: 32.65 KG/M2 | DIASTOLIC BLOOD PRESSURE: 63 MMHG | HEIGHT: 65 IN | TEMPERATURE: 97.5 F | OXYGEN SATURATION: 100 % | HEART RATE: 73 BPM

## 2021-09-15 DIAGNOSIS — M54.42 CHRONIC BILATERAL LOW BACK PAIN WITH BILATERAL SCIATICA: ICD-10-CM

## 2021-09-15 DIAGNOSIS — M54.41 CHRONIC BILATERAL LOW BACK PAIN WITH BILATERAL SCIATICA: ICD-10-CM

## 2021-09-15 DIAGNOSIS — M43.16 SPONDYLOLISTHESIS AT L4-L5 LEVEL: ICD-10-CM

## 2021-09-15 DIAGNOSIS — G89.29 CHRONIC BILATERAL LOW BACK PAIN WITH BILATERAL SCIATICA: ICD-10-CM

## 2021-09-15 PROCEDURE — G8432 DEP SCR NOT DOC, RNG: HCPCS | Performed by: NURSE PRACTITIONER

## 2021-09-15 PROCEDURE — G8417 CALC BMI ABV UP PARAM F/U: HCPCS | Performed by: NURSE PRACTITIONER

## 2021-09-15 PROCEDURE — 1090F PRES/ABSN URINE INCON ASSESS: CPT | Performed by: NURSE PRACTITIONER

## 2021-09-15 PROCEDURE — 1101F PT FALLS ASSESS-DOCD LE1/YR: CPT | Performed by: NURSE PRACTITIONER

## 2021-09-15 PROCEDURE — G8536 NO DOC ELDER MAL SCRN: HCPCS | Performed by: NURSE PRACTITIONER

## 2021-09-15 PROCEDURE — G8399 PT W/DXA RESULTS DOCUMENT: HCPCS | Performed by: NURSE PRACTITIONER

## 2021-09-15 PROCEDURE — 99213 OFFICE O/P EST LOW 20 MIN: CPT | Performed by: NURSE PRACTITIONER

## 2021-09-15 PROCEDURE — G8427 DOCREV CUR MEDS BY ELIG CLIN: HCPCS | Performed by: NURSE PRACTITIONER

## 2021-09-15 RX ORDER — TRAMADOL HYDROCHLORIDE 50 MG/1
50 TABLET ORAL
Qty: 30 TABLET | Refills: 2 | Status: SHIPPED | OUTPATIENT
Start: 2021-09-16 | End: 2021-10-16

## 2021-09-15 RX ORDER — GABAPENTIN 400 MG/1
CAPSULE ORAL
Qty: 90 CAPSULE | Refills: 2 | Status: SHIPPED | OUTPATIENT
Start: 2021-09-16 | End: 2022-01-31 | Stop reason: SDUPTHER

## 2021-09-15 NOTE — PROGRESS NOTES
Chief complaint   Chief Complaint   Patient presents with    Buttocks pain     bilateral       History of Present Illness: Meghan Stafford is a  64 y.o.  female  who comes in today for follow-up of her chronic low back pain with bilateral buttock pain. She is on gabapentin 400 in the morning and 800 at night. She states it does control her buttock pain but she still has significant back pain. She has a known spondylolisthesis at L4-5 and does not wish to have any surgery. We have given her 1 tramadol a day for her pain she states it does help her back pain bringing her pain from a 7 down to a 0-2. She only needs 1 a day at this point. She is allergic to NSAIDs. She reports that on  her 59-year-old daughter  from sepsis secondary to boils under her breast.  Her daughter was an RN and the patient would talk to her every time she went to the doctor. She states she is grieving but is coping.     Physical Exam: The patient is a 64-year-old female well-developed well-nourished who is alert and oriented with a normal mood and affect. She has a full weightbearing nonantalgic gait utilizing a single-point cane. She has 4 out of 5 strength bilateral shoulders. Negative straight leg raise. She has pain with hyperextension lumbar spine.        Assessment and Plan: This is a patient who has spondylolisthesis at L4-5 that gives her chronic back and buttock pain. I have refilled her tramadol and her gabapentin. We will see her back in 3 months sooner if needed.                  Medications  Current Outpatient Medications   Medication Sig Dispense Refill    [START ON 2021] gabapentin (NEURONTIN) 400 mg capsule 1 cap po Q am and 2 cap po Q HS  Indications: neuropathic pain 90 Capsule 2    [START ON 2021] traMADoL (ULTRAM) 50 mg tablet Take 1 Tablet by mouth daily as needed for Pain for up to 30 days.  Indications: chronic pain 30 Tablet 2    celecoxib (CeleBREX) 200 mg capsule Take 1 Capsule by mouth two (2) times a day for 180 days. 120 Capsule 2    amLODIPine (NORVASC) 5 mg tablet TAKE 1 TABLET BY MOUTH EVERY DAY      SITagliptin-metFORMIN (Janumet XR)  mg TM24 Janumet XR 50 mg-500 mg tablet,extended release      ergocalciferol (ERGOCALCIFEROL) 1,250 mcg (50,000 unit) capsule TAKE 1 CAPSULE BY MOUTH ONCE WEEKLY      telmisartan-hydroCHLOROthiazide (MICARDIS HCT) 80-12.5 mg per tablet TAKE 1 TABLET BY MOUTH EVERY DAY      rosuvastatin (CRESTOR) 5 mg tablet Take 5 mg by mouth nightly. Review of systems:    Past Medical History:   Diagnosis Date    Hypertension      Past Surgical History:   Procedure Laterality Date    HX TUBAL LIGATION       Social History     Socioeconomic History    Marital status:      Spouse name: Not on file    Number of children: Not on file    Years of education: Not on file    Highest education level: Not on file   Occupational History    Not on file   Tobacco Use    Smoking status: Never Smoker    Smokeless tobacco: Never Used   Substance and Sexual Activity    Alcohol use: Not on file    Drug use: Not on file    Sexual activity: Not on file   Other Topics Concern    Not on file   Social History Narrative    Not on file     Social Determinants of Health     Financial Resource Strain:     Difficulty of Paying Living Expenses:    Food Insecurity:     Worried About Running Out of Food in the Last Year:     [address] in the Last Year:    Transportation Needs:     Lack of Transportation (Medical):      Lack of Transportation (Non-Medical):    Physical Activity:     Days of Exercise per Week:     Minutes of Exercise per Session:    Stress:     Feeling of Stress :    Social Connections:     Frequency of Communication with Friends and Family:     Frequency of Social Gatherings with Friends and Family:     Attends Jew Services:     Active Member of Clubs or Organizations:     Attends Club or Organization Meetings:     Marital Status:    Intimate Partner Violence:     Fear of Current or Ex-Partner:     Emotionally Abused:     Physically Abused:     Sexually Abused:      No family history on file. Physical Exam:  Visit Vitals  BP (!) 144/63 (BP 1 Location: Left upper arm, BP Patient Position: Sitting, BP Cuff Size: Large adult) Comment: pt asymptomatic, NP aware   Pulse 73   Temp 97.5 °F (36.4 °C) (Skin)   Ht 5' 5\" (1.651 m)   Wt 196 lb (88.9 kg)   SpO2 100%   BMI 32.62 kg/m²     Pain Scale: 4/10  Least pain over the last week has been 0-2/10. Worst pain over the last week has been 7/10. Opioid Risk Tool Reviewed: YES, Score will obtain next visit  Aberrant behaviors: None.    Urine Drug Screen: 6/15/21  Controlled substance agreement on file: yes.     reviewed:yes  Pill count is consistent with his prescription: n/a  Concomitant use of a benzodiazepine: no  MME 20  Narcan not warranted       Risks and benefits of ongoing opiate therapy have been reviewed with the patient. No pain behaviors. Denies thoughts of harming self or others. Pt has a good risk to benefit ratio which allows the pt to function in a home environment without side effects        has been . reviewed and is appropriate    Pt has a consistent UDS in the record      Diagnoses and all orders for this visit:    1. Spondylolisthesis at L4-L5 level  -     gabapentin (NEURONTIN) 400 mg capsule; 1 cap po Q am and 2 cap po Q HS  Indications: neuropathic pain  -     traMADoL (ULTRAM) 50 mg tablet; Take 1 Tablet by mouth daily as needed for Pain for up to 30 days. Indications: chronic pain    2. Chronic bilateral low back pain with bilateral sciatica  -     gabapentin (NEURONTIN) 400 mg capsule; 1 cap po Q am and 2 cap po Q HS  Indications: neuropathic pain  -     traMADoL (ULTRAM) 50 mg tablet; Take 1 Tablet by mouth daily as needed for Pain for up to 30 days.  Indications: chronic pain            Follow-up and Dispositions    · Return in about 3 months (around 12/15/2021) for With nurse practitioner Terrell Habermann.              We have informed Octavia Reid to notify us for immediate appointment if she has any worsening neurogical symptoms or if an emergency situation presents, then call [phone]

## 2021-09-15 NOTE — PROGRESS NOTES
Jumacaroline Janet presents today for   Chief Complaint   Patient presents with    Buttocks pain     bilateral       Is someone accompanying this pt? no    Is the patient using any DME equipment during OV? Yes, cane    Depression Screening:  3 most recent PHQ Screens 12/7/2020   Little interest or pleasure in doing things Not at all   Feeling down, depressed, irritable, or hopeless Not at all   Total Score PHQ 2 0     Fall Risk  Fall Risk Assessment, last 12 mths 12/7/2020   Able to walk? Yes   Fall in past 12 months? No       Coordination of Care:  1. Have you been to the ER, urgent care clinic since your last visit? no  Hospitalized since your last visit? no    2. Have you seen or consulted any other health care providers outside of the [address] [place] [address] since your last visit? Yes, eye doctor Include any pap smears or colon screening.  no

## 2021-10-22 ENCOUNTER — OFFICE VISIT (OUTPATIENT)
Dept: ORTHOPEDIC SURGERY | Age: 76
End: 2021-10-22
Payer: MEDICARE

## 2021-10-22 VITALS — WEIGHT: 195 LBS | TEMPERATURE: 97.1 F | BODY MASS INDEX: 32.45 KG/M2

## 2021-10-22 DIAGNOSIS — M17.0 BILATERAL PRIMARY OSTEOARTHRITIS OF KNEE: Primary | ICD-10-CM

## 2021-10-22 PROCEDURE — G8417 CALC BMI ABV UP PARAM F/U: HCPCS | Performed by: ORTHOPAEDIC SURGERY

## 2021-10-22 PROCEDURE — G8536 NO DOC ELDER MAL SCRN: HCPCS | Performed by: ORTHOPAEDIC SURGERY

## 2021-10-22 PROCEDURE — G8399 PT W/DXA RESULTS DOCUMENT: HCPCS | Performed by: ORTHOPAEDIC SURGERY

## 2021-10-22 PROCEDURE — G8432 DEP SCR NOT DOC, RNG: HCPCS | Performed by: ORTHOPAEDIC SURGERY

## 2021-10-22 PROCEDURE — G8427 DOCREV CUR MEDS BY ELIG CLIN: HCPCS | Performed by: ORTHOPAEDIC SURGERY

## 2021-10-22 PROCEDURE — 1101F PT FALLS ASSESS-DOCD LE1/YR: CPT | Performed by: ORTHOPAEDIC SURGERY

## 2021-10-22 PROCEDURE — 1090F PRES/ABSN URINE INCON ASSESS: CPT | Performed by: ORTHOPAEDIC SURGERY

## 2021-10-22 PROCEDURE — 99213 OFFICE O/P EST LOW 20 MIN: CPT | Performed by: ORTHOPAEDIC SURGERY

## 2021-10-22 NOTE — PROGRESS NOTES
Patient: Alexandria Bueno                MRN: [id number]       SSN: [id number]  YOB: 1945        AGE: 64 y.o. SEX: female  Body mass index is 32.45 kg/m². PCP: Jordyn Zavala MD  10/22/21    Chief Complaint: Bilateral knee pain follow up    HPI: Alexandria Bueno is a 64 y.o. female patient who returns to the office today for bilateral knee pain. At her last visit we tried a prescription for Celebrex which she says is helping her knee pain. She denies any injury or trauma otherwise. Past Medical History:   Diagnosis Date    Hypertension        History reviewed. No pertinent family history. Current Outpatient Medications   Medication Sig Dispense Refill    gabapentin (NEURONTIN) 400 mg capsule 1 cap po Q am and 2 cap po Q HS  Indications: neuropathic pain 90 Capsule 2    celecoxib (CeleBREX) 200 mg capsule Take 1 Capsule by mouth two (2) times a day for 180 days. 120 Capsule 2    amLODIPine (NORVASC) 5 mg tablet TAKE 1 TABLET BY MOUTH EVERY DAY      SITagliptin-metFORMIN (Janumet XR)  mg TM24 Janumet XR 50 mg-500 mg tablet,extended release      ergocalciferol (ERGOCALCIFEROL) 1,250 mcg (50,000 unit) capsule TAKE 1 CAPSULE BY MOUTH ONCE WEEKLY      telmisartan-hydroCHLOROthiazide (MICARDIS HCT) 80-12.5 mg per tablet TAKE 1 TABLET BY MOUTH EVERY DAY      rosuvastatin (CRESTOR) 5 mg tablet Take 5 mg by mouth nightly.          Allergies   Allergen Reactions    Ibuprofen Other (comments)     Terrible stomach pain       Past Surgical History:   Procedure Laterality Date    HX TUBAL LIGATION         Social History     Socioeconomic History    Marital status:      Spouse name: Not on file    Number of children: Not on file    Years of education: Not on file    Highest education level: Not on file   Occupational History    Not on file   Tobacco Use    Smoking status: Never Smoker    Smokeless tobacco: Never Used   Substance and Sexual Activity    Alcohol use: Not on file    Drug use: Not on file    Sexual activity: Not on file   Other Topics Concern    Not on file   Social History Narrative    Not on file     Social Determinants of Health     Financial Resource Strain:     Difficulty of Paying Living Expenses:    Food Insecurity:     Worried About Running Out of Food in the Last Year:     [address] in the Last Year:    Transportation Needs:     Lack of Transportation (Medical):  Lack of Transportation (Non-Medical):    Physical Activity:     Days of Exercise per Week:     Minutes of Exercise per Session:    Stress:     Feeling of Stress :    Social Connections:     Frequency of Communication with Friends and Family:     Frequency of Social Gatherings with Friends and Family:     Attends Scientologist Services:     Active Member of Clubs or Organizations:     Attends Club or Organization Meetings:     Marital Status:    Intimate Partner Violence:     Fear of Current or Ex-Partner:     Emotionally Abused:     Physically Abused:     Sexually Abused:        REVIEW OF SYSTEMS:      No changes from previous review of systems unless noted. PHYSICAL EXAMINATION:  Visit Vitals  Temp 97.1 °F (36.2 °C)   Wt 195 lb (88.5 kg)   BMI 32.45 kg/m²     Body mass index is 32.45 kg/m². GENERAL: Alert and oriented x3, in no acute distress. HEENT: Normocephalic, atraumatic. RESP: Non labored breathing. SKIN: No rashes or lesions noted.    Knee Examination                                                  R                                  L  Effusion                                   -                                   -  Warmth                                   -                                   -  Erythema                                 -                                   -  ROM              Extension                    5                                  5              Flexion                         120                              120  Tenderness Medial Joint                 +                                  +              Lateral Joint                +                                  +              Posterior knee             -                                   -  Strength              Quad                           5                                  5              Hamstring                    5                                  5  Crepitus              Tibiofemoral                +                                  +              Patellofemoral             +                                  +  Instability              Anterior                       -                                   -              Posterior                      -                                   -              Patellofemoral             -                                   -  Lachman's                               -                                   -  Anterior Drawer                       -                                   -  Posterior Drawer                     -                                   -  Angi's              Pain                             -                                   -              Locking                        -                                   -  Calf TTP                                  -                                   -  Straight Leg Raise                  -                                   -    IMAGING:  No imaging today    ASSESSMENT & PLAN  Diagnosis: Bilateral knee osteoarthritis    Lissette's bilateral knee osteoarthritis is responding well to Celebrex. I recommend continue with Celebrex as needed for the pain and if her symptoms persist or worsen follow-up for a corticosteroid injection. Follow-up as needed. Electronically signed by: Darcy Rosario MD    Note: This note was completed using voice recognition software.   Any typographical/name errors or mistakes are unintentional.

## 2022-01-31 ENCOUNTER — OFFICE VISIT (OUTPATIENT)
Dept: ORTHOPEDIC SURGERY | Age: 77
End: 2022-01-31
Payer: MEDICARE

## 2022-01-31 VITALS
BODY MASS INDEX: 33.26 KG/M2 | HEART RATE: 83 BPM | TEMPERATURE: 98 F | OXYGEN SATURATION: 100 % | WEIGHT: 199.6 LBS | HEIGHT: 65 IN

## 2022-01-31 DIAGNOSIS — M54.42 CHRONIC BILATERAL LOW BACK PAIN WITH BILATERAL SCIATICA: ICD-10-CM

## 2022-01-31 DIAGNOSIS — G89.29 CHRONIC BILATERAL LOW BACK PAIN WITH BILATERAL SCIATICA: ICD-10-CM

## 2022-01-31 DIAGNOSIS — M54.41 CHRONIC BILATERAL LOW BACK PAIN WITH BILATERAL SCIATICA: ICD-10-CM

## 2022-01-31 DIAGNOSIS — M43.16 SPONDYLOLISTHESIS AT L4-L5 LEVEL: Primary | ICD-10-CM

## 2022-01-31 PROCEDURE — 99213 OFFICE O/P EST LOW 20 MIN: CPT | Performed by: NURSE PRACTITIONER

## 2022-01-31 PROCEDURE — G8432 DEP SCR NOT DOC, RNG: HCPCS | Performed by: NURSE PRACTITIONER

## 2022-01-31 PROCEDURE — G8536 NO DOC ELDER MAL SCRN: HCPCS | Performed by: NURSE PRACTITIONER

## 2022-01-31 PROCEDURE — G8417 CALC BMI ABV UP PARAM F/U: HCPCS | Performed by: NURSE PRACTITIONER

## 2022-01-31 PROCEDURE — G8427 DOCREV CUR MEDS BY ELIG CLIN: HCPCS | Performed by: NURSE PRACTITIONER

## 2022-01-31 PROCEDURE — G8399 PT W/DXA RESULTS DOCUMENT: HCPCS | Performed by: NURSE PRACTITIONER

## 2022-01-31 PROCEDURE — 1101F PT FALLS ASSESS-DOCD LE1/YR: CPT | Performed by: NURSE PRACTITIONER

## 2022-01-31 PROCEDURE — 1090F PRES/ABSN URINE INCON ASSESS: CPT | Performed by: NURSE PRACTITIONER

## 2022-01-31 RX ORDER — EMPAGLIFLOZIN 10 MG/1
TABLET, FILM COATED ORAL
COMMUNITY
Start: 2022-01-25

## 2022-01-31 RX ORDER — TRAMADOL HYDROCHLORIDE 50 MG/1
TABLET ORAL
COMMUNITY
Start: 2021-11-24 | End: 2022-01-31 | Stop reason: SDUPTHER

## 2022-01-31 RX ORDER — TRAMADOL HYDROCHLORIDE 50 MG/1
50 TABLET ORAL
Qty: 30 TABLET | Refills: 2 | Status: SHIPPED | OUTPATIENT
Start: 2022-01-31 | End: 2022-03-02

## 2022-01-31 RX ORDER — GABAPENTIN 400 MG/1
CAPSULE ORAL
Qty: 90 CAPSULE | Refills: 1 | Status: SHIPPED | OUTPATIENT
Start: 2022-02-24

## 2022-01-31 NOTE — PROGRESS NOTES
Chief complaint   Chief Complaint   Patient presents with    Back Pain       History of Present Illness: Kaylee Durbin is a  64 y.o.  female  who comes in today for follow-up of her chronic low back pain with bilateral buttock pain. She is on gabapentin 400 in the morning and 800 at night. She states it does control her buttock pain but she still has significant back pain. She has a known spondylolisthesis at L4-5 and does not wish to have any surgery. We give her 1 tramadol a day for her pain and Dr. Marek Callahan about she states it does help her back pain bringing her pain from a 7 down to a 0-2. She states she did run out on [place] Day. She missed her last appointment and could not get until now. She states her PCP did give her 1 refill the gabapentin. She is allergic to NSAIDs. She denies fever bowel bladder dysfunction.       Physical Exam: The patient is a 70-year-old female well-developed well-nourished who is alert and oriented with a normal mood and affect. She has a full weightbearing nonantalgic gait utilizing a single-point cane. She has 4 out of 5 strength bilateral shoulders. Negative straight leg raise. She has pain with hyperextension lumbar spine.        Assessment and Plan: This is a patient who has spondylolisthesis at L4-5 that gives her chronic back and buttock pain. Was going to get a UDS today but I am going to defer that since she has been out for over a month now of her tramadol. I have refilled her tramadol and her gabapentin. We will see her back in 3 months sooner if needed.                  Medications  Current Outpatient Medications   Medication Sig Dispense Refill    traMADoL (ULTRAM) 50 mg tablet Take 1 Tablet by mouth daily as needed for Pain for up to 30 days.  30 Tablet 2    [START ON 2/24/2022] gabapentin (NEURONTIN) 400 mg capsule 1 cap po Q am and 2 cap po Q HS  Indications: neuropathic pain 90 Capsule 1    Jardiance 10 mg tablet       amLODIPine (NORVASC) 5 mg tablet TAKE 1 TABLET BY MOUTH EVERY DAY      ergocalciferol (ERGOCALCIFEROL) 1,250 mcg (50,000 unit) capsule TAKE 1 CAPSULE BY MOUTH ONCE WEEKLY      telmisartan-hydroCHLOROthiazide (MICARDIS HCT) 80-12.5 mg per tablet TAKE 1 TABLET BY MOUTH EVERY DAY      rosuvastatin (CRESTOR) 5 mg tablet Take 5 mg by mouth nightly. Review of systems:    Past Medical History:   Diagnosis Date    Hypertension      Past Surgical History:   Procedure Laterality Date    HX TUBAL LIGATION       Social History     Socioeconomic History    Marital status:      Spouse name: Not on file    Number of children: Not on file    Years of education: Not on file    Highest education level: Not on file   Occupational History    Not on file   Tobacco Use    Smoking status: Never Smoker    Smokeless tobacco: Never Used   Vaping Use    Vaping Use: Never used   Substance and Sexual Activity    Alcohol use: Not on file    Drug use: Not on file    Sexual activity: Not on file   Other Topics Concern    Not on file   Social History Narrative    Not on file     Social Determinants of Health     Financial Resource Strain:     Difficulty of Paying Living Expenses: Not on file   Food Insecurity:     Worried About [address] in the Last Year: Not on file    Margaret of Food in the Last Year: Not on file   Transportation Needs:     Lack of Transportation (Medical): Not on file    Lack of Transportation (Non-Medical):  Not on file   Physical Activity:     Days of Exercise per Week: Not on file    Minutes of Exercise per Session: Not on file   Stress:     Feeling of Stress : Not on file   Social Connections:     Frequency of Communication with Friends and Family: Not on file    Frequency of Social Gatherings with Friends and Family: Not on file    Attends Mandaen Services: Not on file    Active Member of Clubs or Organizations: Not on file    Attends Club or Organization Meetings: Not on file   Benjamin Wolf Marital Status: Not on file   Intimate Partner Violence:     Fear of Current or Ex-Partner: Not on file    Emotionally Abused: Not on file    Physically Abused: Not on file    Sexually Abused: Not on file   Housing Stability:     Unable to Pay for Housing in the Last Year: Not on file    Number of [place] in the Last Year: Not on file    Unstable Housing in the Last Year: Not on file     History reviewed. No pertinent family history. Physical Exam:  Visit Vitals  Pulse 83   Temp 98 °F (36.7 °C) (Temporal)   Ht 5' 5\" (1.651 m)   Wt 199 lb 9.6 oz (90.5 kg)   SpO2 100%   BMI 33.22 kg/m²     Pain Scale: 4/10  Least pain over the last week has been 0-2/10. Worst pain over the last week has been 7/10. Opioid Risk Tool Reviewed: YES, Score will obtain next visit  Aberrant behaviors: None.    Urine Drug Screen: 6/15/21 consistent  Controlled substance agreement on file: yes.     reviewed:yes  Pill count is consistent with his prescription: n/a  Concomitant use of a benzodiazepine: no  MME 20  Narcan not warranted       Risks and benefits of ongoing opiate therapy have been reviewed with the patient. No pain behaviors. Denies thoughts of harming self or others. Pt has a good risk to benefit ratio which allows the pt to function in a home environment without side effects        has been . reviewed and is appropriate    Pt has a consistent UDS in the record      Diagnoses and all orders for this visit:    1. Spondylolisthesis at L4-L5 level  -     traMADoL (ULTRAM) 50 mg tablet; Take 1 Tablet by mouth daily as needed for Pain for up to 30 days.  -     gabapentin (NEURONTIN) 400 mg capsule; 1 cap po Q am and 2 cap po Q HS  Indications: neuropathic pain    2. Chronic bilateral low back pain with bilateral sciatica  -     traMADoL (ULTRAM) 50 mg tablet;  Take 1 Tablet by mouth daily as needed for Pain for up to 30 days.  -     gabapentin (NEURONTIN) 400 mg capsule; 1 cap po Q am and 2 cap po Q HS Indications: neuropathic pain            Follow-up and Dispositions    · Return in about 3 months (around 4/30/2022) for with NP [place].              We have informed Cole Hoffman to notify us for immediate appointment if she has any worsening neurogical symptoms or if an emergency situation presents, then call [phone]

## 2022-03-02 DIAGNOSIS — M17.0 BILATERAL PRIMARY OSTEOARTHRITIS OF KNEE: Primary | ICD-10-CM

## 2022-03-02 NOTE — TELEPHONE ENCOUNTER
Last Visit: 10/22/21 with MD Veena Burdick  Next Appointment: Advised to follow-up PRN  Previous Refill Encounter(s): 7/23/21 #120 with 2 refills    Requested Prescriptions     Pending Prescriptions Disp Refills    celecoxib (CeleBREX) 200 mg capsule 120 Capsule 2     Sig: Take 1 Capsule by mouth two (2) times a day.

## 2022-03-07 RX ORDER — CELECOXIB 200 MG/1
200 CAPSULE ORAL 2 TIMES DAILY
Qty: 120 CAPSULE | Refills: 2 | Status: SHIPPED | OUTPATIENT
Start: 2022-03-07

## 2022-05-03 ENCOUNTER — VIRTUAL VISIT (OUTPATIENT)
Dept: ORTHOPEDIC SURGERY | Age: 77
End: 2022-05-03
Payer: MEDICARE

## 2022-05-03 DIAGNOSIS — M54.42 CHRONIC BILATERAL LOW BACK PAIN WITH BILATERAL SCIATICA: ICD-10-CM

## 2022-05-03 DIAGNOSIS — M43.16 SPONDYLOLISTHESIS AT L4-L5 LEVEL: Primary | ICD-10-CM

## 2022-05-03 DIAGNOSIS — M54.41 CHRONIC BILATERAL LOW BACK PAIN WITH BILATERAL SCIATICA: ICD-10-CM

## 2022-05-03 DIAGNOSIS — G89.29 CHRONIC BILATERAL LOW BACK PAIN WITH BILATERAL SCIATICA: ICD-10-CM

## 2022-05-03 PROCEDURE — 99443 PR PHYS/QHP TELEPHONE EVALUATION 21-30 MIN: CPT | Performed by: NURSE PRACTITIONER

## 2022-05-03 RX ORDER — GABAPENTIN 400 MG/1
CAPSULE ORAL
Qty: 120 CAPSULE | Refills: 2 | Status: SHIPPED | OUTPATIENT
Start: 2022-05-03 | End: 2022-07-19 | Stop reason: SDUPTHER

## 2022-05-03 RX ORDER — TRAMADOL HYDROCHLORIDE 50 MG/1
50 TABLET ORAL
Qty: 30 TABLET | Refills: 2 | Status: SHIPPED | OUTPATIENT
Start: 2022-05-03 | End: 2022-08-05 | Stop reason: SDUPTHER

## 2022-05-03 RX ORDER — METOPROLOL SUCCINATE 50 MG/1
TABLET, EXTENDED RELEASE ORAL
COMMUNITY
Start: 2022-01-31

## 2022-05-03 RX ORDER — TRAMADOL HYDROCHLORIDE 50 MG/1
TABLET ORAL
COMMUNITY
Start: 2022-04-02 | End: 2022-05-03 | Stop reason: SDUPTHER

## 2022-05-03 NOTE — PROGRESS NOTES
History of Present Illness:    Consent: Rajiv Vera, who was seen by telephone call, and/or her healthcare decision maker, is aware that this patient-initiated, Telehealth encounter on 5/3/2022 is a billable service, with coverage as determined by her insurance carrier. She is aware that she may receive a bill and has provided verbal consent to proceed: Yes. The provider was located at [facility] office and the patient was located at their home. No one else participated in the visit with the patient. The platform used was telephone call    Rajiv Vera is a  64 y.o.  female Gaysarah Hernandez was evaluated  today for follow-up of her chronic low back pain with bilateral buttock pain.  She is on gabapentin 400 in the morning and 800 at night.  She states it does control her buttock pain, but she is starting to have pain that radiates into her posterior thighs.   She has a known spondylolisthesis at L4-5 and does not wish to have any surgery.    We give her 1 tramadol a day for her pain  and it is bringing her pain from a 7 down to a 0-2. She is allergic to NSAIDs.    She denies fever bowel bladder dysfunction.         Physical Exam: The patient is a 68-year-old female who is alert and oriented. She spoke with fluency. She did not appear to be in distress. Assessment & Plan: This is a patient who has spondylolisthesis at L4-5 that gives her chronic back and buttock pain. She is starting to get more pain down into her legs to the mid thigh area. I am going to increase her gabapentin to 400 twice a day and 800 at night. I have refilled her tramadol. .We will see her back in 3 months in the office sooner if needed.              Diagnoses and all orders for this visit:    1. Spondylolisthesis at L4-L5 level  -     traMADoL (ULTRAM) 50 mg tablet; Take 1 Tablet by mouth daily as needed for Pain for up to 30 days.  -     gabapentin (NEURONTIN) 400 mg capsule; 1 cap bid and 2 caps q hs  Indications: neuropathic pain    2. Chronic bilateral low back pain with bilateral sciatica  -     traMADoL (ULTRAM) 50 mg tablet; Take 1 Tablet by mouth daily as needed for Pain for up to 30 days.  -     gabapentin (NEURONTIN) 400 mg capsule; 1 cap bid and 2 caps q hs  Indications: neuropathic pain          Pain Scale: /10      Least pain over the last week has been 0-2/10. Worst pain over the last week has been 7/10. Opioid Risk Tool Reviewed: YES, Score 0  Aberrant behaviors: None. Urine Drug Screen: 6/15/21 consistent. Controlled substance agreement on file: yes.  reviewed:yes  Pill count is consistent with his prescription: n/a  Concomitant use of a benzodiazepine: no  MME 5  Narcan not warranted        Risks and benefits of ongoing opiate therapy have been reviewed with the patient. No pain behaviors. Denies thoughts of harming self or others. Pt has a good risk to benefit ratio which allows the pt to function in a home environment without side effects        has been reviewed and is appropriate    Pt has a consistent UDS in the record        We discussed the expected course, resolution and complications of the diagnosis(es) in detail. Medication risks, benefits, costs, interactions, and alternatives were discussed as indicated. I advised her to contact the office if her condition worsens, changes or fails to improve as anticipated. For emergencies or worsening neurological symptoms the patient was instructed to call 911. She expressed understanding with the diagnosis(es) and plan. Follow-up and Dispositions    · Return in about 11 weeks (around 7/19/2022). Yanique Coombs is a 64 y.o. female being evaluated by a video visit encounter for concerns as above. A caregiver was present when appropriate.  Due to this being a TeleHealth encounter (During [facility]-14 public health emergency), evaluation of the following organ systems was limited: Vitals/Constitutional/EENT/Resp/CV/GI//MS/Neuro/Skin/Heme-Lymph-Imm. Pursuant to the emergency declaration under the [address] [place] [address] authority and the [employer] and [employer] Act, this Virtual  Visit was conducted, with patient's (and/or legal guardian's) consent, to reduce the patient's risk of exposure to COVID-19 and provide necessary medical care. CPT Codes 01267-65350 for Established Patients may apply to this Telehealth Visit  Time-based coding, delete if not needed: I spent at least 15 minutes with this established patient, and >50% of the time was spent counseling and/or coordinating care regarding Back and leg pain  Start time: 3:55 PM and Stop time: 4:18 PM.        Due to this being a TeleHealth evaluation, many elements of the physical examination are unable to be assessed. Pursuant to the emergency declaration under the [address] [place] [address], [address] [place] [address] authority and the [employer] and [employer] Act, this Virtual  Visit was conducted, with patient's consent, to reduce the patient's risk of exposure to COVID-19 and provide continuity of care for an established patient. Services were provided through a video synchronous discussion virtually to substitute for in-person clinic visit.     Catherine Suggs NP

## 2022-07-19 ENCOUNTER — OFFICE VISIT (OUTPATIENT)
Dept: ORTHOPEDIC SURGERY | Age: 77
End: 2022-07-19
Payer: MEDICARE

## 2022-07-19 VITALS — WEIGHT: 190 LBS | BODY MASS INDEX: 31.62 KG/M2 | TEMPERATURE: 96.7 F | OXYGEN SATURATION: 100 % | HEART RATE: 65 BPM

## 2022-07-19 DIAGNOSIS — Z51.81 ENCOUNTER FOR THERAPEUTIC DRUG MONITORING: Primary | ICD-10-CM

## 2022-07-19 DIAGNOSIS — M54.42 CHRONIC BILATERAL LOW BACK PAIN WITH BILATERAL SCIATICA: ICD-10-CM

## 2022-07-19 DIAGNOSIS — M54.41 CHRONIC BILATERAL LOW BACK PAIN WITH BILATERAL SCIATICA: ICD-10-CM

## 2022-07-19 DIAGNOSIS — M43.16 SPONDYLOLISTHESIS AT L4-L5 LEVEL: ICD-10-CM

## 2022-07-19 DIAGNOSIS — Z51.81 ENCOUNTER FOR THERAPEUTIC DRUG MONITORING: ICD-10-CM

## 2022-07-19 DIAGNOSIS — G89.29 CHRONIC BILATERAL LOW BACK PAIN WITH BILATERAL SCIATICA: ICD-10-CM

## 2022-07-19 PROCEDURE — 1090F PRES/ABSN URINE INCON ASSESS: CPT | Performed by: NURSE PRACTITIONER

## 2022-07-19 PROCEDURE — 99213 OFFICE O/P EST LOW 20 MIN: CPT | Performed by: NURSE PRACTITIONER

## 2022-07-19 PROCEDURE — G8417 CALC BMI ABV UP PARAM F/U: HCPCS | Performed by: NURSE PRACTITIONER

## 2022-07-19 PROCEDURE — 1101F PT FALLS ASSESS-DOCD LE1/YR: CPT | Performed by: NURSE PRACTITIONER

## 2022-07-19 PROCEDURE — G8399 PT W/DXA RESULTS DOCUMENT: HCPCS | Performed by: NURSE PRACTITIONER

## 2022-07-19 PROCEDURE — G8427 DOCREV CUR MEDS BY ELIG CLIN: HCPCS | Performed by: NURSE PRACTITIONER

## 2022-07-19 PROCEDURE — G8536 NO DOC ELDER MAL SCRN: HCPCS | Performed by: NURSE PRACTITIONER

## 2022-07-19 PROCEDURE — G8432 DEP SCR NOT DOC, RNG: HCPCS | Performed by: NURSE PRACTITIONER

## 2022-07-19 PROCEDURE — 1123F ACP DISCUSS/DSCN MKR DOCD: CPT | Performed by: NURSE PRACTITIONER

## 2022-07-19 RX ORDER — GABAPENTIN 400 MG/1
CAPSULE ORAL
Qty: 120 CAPSULE | Refills: 1 | Status: SHIPPED | OUTPATIENT
Start: 2022-08-18 | End: 2022-10-18 | Stop reason: SDUPTHER

## 2022-07-19 NOTE — PROGRESS NOTES
Chief complaint No chief complaint on file. History of Present Illness: Darlene Mccloud is a  76 y.o.  female  Luis Eduardo Alvarado a  77 y. o.  female  who was evaluated  today for follow-up of her chronic low back pain with bilateral buttock pain.  Last visit we increased her gabapentin to 400 mg twice a day and 800 at night. She states this is helped her tremendously with her buttock pain and posterior thighs.   She has a known spondylolisthesis at L4-5 and does not wish to have any surgery.    We give her 1 tramadol a day for her pain  and it is bringing her pain from a 7 down to a 0-2. She states she does not have to take it every day but when she does a lot of activity she does take. She is allergic to NSAIDs.     She denies fever bowel bladder dysfunction      Physical Exam: Patient is a 55-year-old female well-developed well-nourished who is alert and oriented with a normal mood and affect. She has a stiff but full weightbearing slow nonantalgic gait. She uses a cane. She has 5 5 strength bilateral lower extremities. Negative straight leg raise. She has pain with hyperextension lumbar spine. Assessment and Plan: This is a patient with a spondylolisthesis L4-5 that gives her chronic back and radicular pain. She is doing well on the increased gabapentin dose. I have refilled that for her. We will get a UDS today. Her last dose of tramadol was 3 days ago. She does not need a refill of tramadol at this time. We will see her back in 3-month sooner if needed. Medications  Current Outpatient Medications   Medication Sig Dispense Refill    [START ON 8/18/2022] gabapentin (NEURONTIN) 400 mg capsule 1 cap bid and 2 caps q hs  Indications: neuropathic pain 120 Capsule 1    metoprolol succinate (TOPROL-XL) 50 mg XL tablet TAKE 1 TABLET BY MOUTH EVERY DAY FOR 90 DAYS      celecoxib (CeleBREX) 200 mg capsule Take 1 Capsule by mouth two (2) times a day.  120 Capsule 2    Jardiance 10 mg tablet       gabapentin (NEURONTIN) 400 mg capsule 1 cap po Q am and 2 cap po Q HS  Indications: neuropathic pain 90 Capsule 1    ergocalciferol (ERGOCALCIFEROL) 1,250 mcg (50,000 unit) capsule TAKE 1 CAPSULE BY MOUTH ONCE WEEKLY      telmisartan-hydroCHLOROthiazide (MICARDIS HCT) 80-12.5 mg per tablet TAKE 1 TABLET BY MOUTH EVERY DAY      rosuvastatin (CRESTOR) 5 mg tablet Take 5 mg by mouth nightly. Review of systems:    Past Medical History:   Diagnosis Date    Hypertension      Past Surgical History:   Procedure Laterality Date    HX TUBAL LIGATION       Social History     Socioeconomic History    Marital status:      Spouse name: Not on file    Number of children: Not on file    Years of education: Not on file    Highest education level: Not on file   Occupational History    Not on file   Tobacco Use    Smoking status: Never Smoker    Smokeless tobacco: Never Used   Vaping Use    Vaping Use: Never used   Substance and Sexual Activity    Alcohol use: Not on file    Drug use: Not on file    Sexual activity: Not on file   Other Topics Concern    Not on file   Social History Narrative    Not on file     Social Determinants of Health     Financial Resource Strain:     Difficulty of Paying Living Expenses: Not on file   Food Insecurity:     Worried About [address] in the Last Year: Not on file    Margaret of Food in the Last Year: Not on file   Transportation Needs:     Lack of Transportation (Medical): Not on file    Lack of Transportation (Non-Medical):  Not on file   Physical Activity:     Days of Exercise per Week: Not on file    Minutes of Exercise per Session: Not on file   Stress:     Feeling of Stress : Not on file   Social Connections:     Frequency of Communication with Friends and Family: Not on file    Frequency of Social Gatherings with Friends and Family: Not on file    Attends Scientologist Services: Not on file   [employer] of Clubs or Organizations: Not on file    Attends Club or Organization Meetings: Not on file    Marital Status: Not on file   Intimate Partner Violence:     Fear of Current or Ex-Partner: Not on file    Emotionally Abused: Not on file    Physically Abused: Not on file    Sexually Abused: Not on file   Housing Stability:     Unable to Pay for Housing in the Last Year: Not on file    Number of [place] in the Last Year: Not on file    Unstable Housing in the Last Year: Not on file     No family history on file. Physical Exam:  Visit Vitals  Pulse 65   Temp (!) 96.7 °F (35.9 °C) (Temporal)   Wt 190 lb (86.2 kg)   SpO2 100%   BMI 31.62 kg/m²     Pain Scale: 4/10    Least pain over the last week has been 0-2/10. Worst pain over the last week has been 7/10. Opioid Risk Tool Reviewed: YES, Score 0  Aberrant behaviors: None. Urine Drug Screen: today. Controlled substance agreement on file: yes.  reviewed:yes  Pill count is consistent with his prescription: n/a  Concomitant use of a benzodiazepine: no  MME 0-5  Narcan not warranted        Risks and benefits of ongoing opiate therapy have been reviewed with the patient. No pain behaviors. Denies thoughts of harming self or others. Pt has a good risk to benefit ratio which allows the pt to function in a home environment without side effects        has been . reviewed and is appropriate    Pt has a consistent UDS in the record      Diagnoses and all orders for this visit:    1. Encounter for therapeutic drug monitoring  -     DRUG SCREEN UR - W/ CONFIRM; Future    2. Spondylolisthesis at L4-L5 level  -     gabapentin (NEURONTIN) 400 mg capsule; 1 cap bid and 2 caps q hs  Indications: neuropathic pain  -     DRUG SCREEN UR - W/ CONFIRM; Future    3.  Chronic bilateral low back pain with bilateral sciatica  -     gabapentin (NEURONTIN) 400 mg capsule; 1 cap bid and 2 caps q hs  Indications: neuropathic pain  -     DRUG SCREEN UR - W/ CONFIRM; Future            Follow-up and Dispositions    · Return in about 3 months (around 10/19/2022) for with XIOMY Dias. We have informed Pankaj Frye to notify us for immediate appointment if she has any worsening neurogical symptoms or if an emergency situation presents, then call 911      Please note that this dictation was completed with [employer], the computer voice recognition software. Quite often unanticipated grammatical, syntax, homophones, and other interpretive errors are inadvertently transcribed by the computer software. Please disregard these errors. Please excuse any errors that have escaped final proofreading.

## 2022-08-05 DIAGNOSIS — M54.42 CHRONIC BILATERAL LOW BACK PAIN WITH BILATERAL SCIATICA: ICD-10-CM

## 2022-08-05 DIAGNOSIS — M54.41 CHRONIC BILATERAL LOW BACK PAIN WITH BILATERAL SCIATICA: ICD-10-CM

## 2022-08-05 DIAGNOSIS — G89.29 CHRONIC BILATERAL LOW BACK PAIN WITH BILATERAL SCIATICA: ICD-10-CM

## 2022-08-05 DIAGNOSIS — M43.16 SPONDYLOLISTHESIS AT L4-L5 LEVEL: ICD-10-CM

## 2022-08-05 NOTE — TELEPHONE ENCOUNTER
UDS done 07/19/22 is consistent. A  was obtained and the last fill date for the tramadol was 07/19/22. No aberrancies noted. The next 2 months of meds have been pended to the provider for review.

## 2022-08-07 RX ORDER — TRAMADOL HYDROCHLORIDE 50 MG/1
50 TABLET ORAL
Qty: 30 TABLET | Refills: 1 | Status: SHIPPED | OUTPATIENT
Start: 2022-08-18 | End: 2022-09-17

## 2022-09-28 NOTE — TELEPHONE ENCOUNTER
Patient left a message requesting a refill on Neurontin. Patient still has refills at the pharmacy for this med. I contacted the pharmacy to confirm and requested they refill it for the patient. No further action needed. For [id number] in place:   Recommendation Provided To:    Intervention Detail: New Rx: 1, reason: Patient Preference  Gap Closed?:   Intervention Accepted By:   Time Spent (min): 5

## 2022-10-18 ENCOUNTER — OFFICE VISIT (OUTPATIENT)
Dept: ORTHOPEDIC SURGERY | Age: 77
End: 2022-10-18
Payer: MEDICARE

## 2022-10-18 VITALS
RESPIRATION RATE: 18 BRPM | HEIGHT: 65 IN | TEMPERATURE: 97.4 F | HEART RATE: 71 BPM | BODY MASS INDEX: 32.65 KG/M2 | OXYGEN SATURATION: 97 % | WEIGHT: 196 LBS

## 2022-10-18 DIAGNOSIS — M43.16 SPONDYLOLISTHESIS AT L4-L5 LEVEL: ICD-10-CM

## 2022-10-18 DIAGNOSIS — M54.42 CHRONIC BILATERAL LOW BACK PAIN WITH BILATERAL SCIATICA: ICD-10-CM

## 2022-10-18 DIAGNOSIS — M54.41 CHRONIC BILATERAL LOW BACK PAIN WITH BILATERAL SCIATICA: ICD-10-CM

## 2022-10-18 DIAGNOSIS — G89.29 CHRONIC BILATERAL LOW BACK PAIN WITH BILATERAL SCIATICA: ICD-10-CM

## 2022-10-18 PROCEDURE — 1101F PT FALLS ASSESS-DOCD LE1/YR: CPT | Performed by: NURSE PRACTITIONER

## 2022-10-18 PROCEDURE — G8399 PT W/DXA RESULTS DOCUMENT: HCPCS | Performed by: NURSE PRACTITIONER

## 2022-10-18 PROCEDURE — G8417 CALC BMI ABV UP PARAM F/U: HCPCS | Performed by: NURSE PRACTITIONER

## 2022-10-18 PROCEDURE — G8536 NO DOC ELDER MAL SCRN: HCPCS | Performed by: NURSE PRACTITIONER

## 2022-10-18 PROCEDURE — 1090F PRES/ABSN URINE INCON ASSESS: CPT | Performed by: NURSE PRACTITIONER

## 2022-10-18 PROCEDURE — 99213 OFFICE O/P EST LOW 20 MIN: CPT | Performed by: NURSE PRACTITIONER

## 2022-10-18 PROCEDURE — 1123F ACP DISCUSS/DSCN MKR DOCD: CPT | Performed by: NURSE PRACTITIONER

## 2022-10-18 PROCEDURE — G8432 DEP SCR NOT DOC, RNG: HCPCS | Performed by: NURSE PRACTITIONER

## 2022-10-18 PROCEDURE — G8427 DOCREV CUR MEDS BY ELIG CLIN: HCPCS | Performed by: NURSE PRACTITIONER

## 2022-10-18 RX ORDER — TRAMADOL HYDROCHLORIDE 50 MG/1
TABLET ORAL
COMMUNITY
Start: 2022-09-19 | End: 2022-10-18 | Stop reason: SDUPTHER

## 2022-10-18 RX ORDER — TRAMADOL HYDROCHLORIDE 50 MG/1
50 TABLET ORAL
Qty: 30 TABLET | Refills: 2 | Status: SHIPPED | OUTPATIENT
Start: 2022-10-18 | End: 2022-11-17

## 2022-10-18 RX ORDER — GABAPENTIN 400 MG/1
CAPSULE ORAL
Qty: 120 CAPSULE | Refills: 2 | Status: SHIPPED | OUTPATIENT
Start: 2022-10-28

## 2022-10-18 NOTE — PROGRESS NOTES
Jeovanny Thompson presents today for   Chief Complaint   Patient presents with    Back Pain       Is someone accompanying this pt? no    Is the patient using any DME equipment during OV? no    Depression Screening:  3 most recent PHQ Screens 1/31/2022   Little interest or pleasure in doing things Not at all   Feeling down, depressed, irritable, or hopeless Not at all   Total Score PHQ 2 0       Learning Assessment:  No flowsheet data found. Abuse Screening:  No flowsheet data found. Fall Risk  Fall Risk Assessment, last 12 mths 10/18/2022   Able to walk? Yes   Fall in past 12 months? 0   Do you feel unsteady? 1   Are you worried about falling 0       OPIOID RISK TOOL  No flowsheet data found. Coordination of Care:  1. Have you been to the ER, urgent care clinic since your last visit? no  Hospitalized since your last visit? no    2. Have you seen or consulted any other health care providers outside of the [address] [place] [address] since your last visit? no Include any pap smears or colon screening.  no

## 2022-10-18 NOTE — PROGRESS NOTES
Chief complaint   Chief Complaint   Patient presents with    Back Pain       History of Present Illness: Michael Gracia is a  76 y.o.  female  Michael Gracia is a  76 y.o.  female  who was evaluated  today for follow-up of her chronic low back pain with bilateral buttock pain. She continues to do well on gabapentin 400 mg twice a day and 800 at night. She is not having any side effects from it. She states this is helped her tremendously with her buttock pain and posterior thighs. She has a known spondylolisthesis at L4-5 and does not wish to have any surgery. We give her 1 tramadol a day for her pain  and it is bringing her pain from a 7 dn to a 0-2. She states that she will take arthritis strength Tylenol with that and it seems to work better when she does that. She states she does not have to take it every day but when she does a lot of activity she does take. She is allergic to NSAIDs. She denies fever bowel bladder dysfunction      Physical Exam: Patient is a 47-year-old female well-developed well-nourished who is alert and oriented with a normal mood and affect. She has a stiff but full weightbearing slow nonantalgic gait. She uses a cane. She has 4/5 strength bilateral lower extremities. Negative straight leg raise. She has pain with hyperextension lumbar spine. Assessment and Plan: This is a patient with a spondylolisthesis L4-5 that gives her chronic back and radicular pain. She is doing well on  gabapentin and tramadol. I have refilled them both for her. We will see her back in 3-month sooner if needed. Medications  Current Outpatient Medications   Medication Sig Dispense Refill    traMADoL (ULTRAM) 50 mg tablet Take 1 Tablet by mouth daily as needed for Pain for up to 30 days.  Indications: chronic pain 30 Tablet 2    [START ON 10/28/2022] gabapentin (NEURONTIN) 400 mg capsule 1 cap bid and 2 caps q hs  Indications: neuropathic pain 120 Capsule 2    metoprolol succinate (TOPROL-XL) 50 mg XL tablet TAKE 1 TABLET BY MOUTH EVERY DAY FOR 90 DAYS      celecoxib (CeleBREX) 200 mg capsule Take 1 Capsule by mouth two (2) times a day. 120 Capsule 2    Jardiance 10 mg tablet       gabapentin (NEURONTIN) 400 mg capsule 1 cap po Q am and 2 cap po Q HS  Indications: neuropathic pain 90 Capsule 1    ergocalciferol (ERGOCALCIFEROL) 1,250 mcg (50,000 unit) capsule TAKE 1 CAPSULE BY MOUTH ONCE WEEKLY      telmisartan-hydroCHLOROthiazide (MICARDIS HCT) 80-12.5 mg per tablet TAKE 1 TABLET BY MOUTH EVERY DAY      rosuvastatin (CRESTOR) 5 mg tablet Take 5 mg by mouth nightly. Review of systems:    Past Medical History:   Diagnosis Date    Hypertension      Past Surgical History:   Procedure Laterality Date    HX TUBAL LIGATION       Social History     Socioeconomic History    Marital status:      Spouse name: Not on file    Number of children: Not on file    Years of education: Not on file    Highest education level: Not on file   Occupational History    Not on file   Tobacco Use    Smoking status: Never    Smokeless tobacco: Never   Vaping Use    Vaping Use: Never used   Substance and Sexual Activity    Alcohol use: Not on file    Drug use: Not on file    Sexual activity: Not on file   Other Topics Concern    Not on file   Social History Narrative    Not on file     Social Determinants of Health     Financial Resource Strain: Not on file   Food Insecurity: Not on file   Transportation Needs: Not on file   Physical Activity: Not on file   Stress: Not on file   Social Connections: Not on file   Intimate Partner Violence: Not on file   Housing Stability: Not on file     No family history on file. Physical Exam:  Visit Vitals  Pulse 71   Temp 97.4 °F (36.3 °C) (Temporal)   Resp 18   Ht 5' 5\" (1.651 m)   Wt 196 lb (88.9 kg)   SpO2 97% Comment: RA   BMI 32.62 kg/m²     Pain Scale: 6/10    Least pain over the last week has been 0-2/10.   Worst pain over the last week has been 7/10.  Opioid Risk Tool Reviewed: YES, Score 0  Aberrant behaviors: None. Urine Drug Screen: today. Controlled substance agreement on file: yes.  reviewed:yes  Pill count is consistent with his prescription: n/a  Concomitant use of a benzodiazepine: no  MME 0-5  Narcan not warranted        Risks and benefits of ongoing opiate therapy have been reviewed with the patient. No pain behaviors. Denies thoughts of harming self or others. Pt has a good risk to benefit ratio which allows the pt to function in a home environment without side effects        has been . reviewed and is appropriate    Pt has a consistent UDS in the record      Diagnoses and all orders for this visit:    1. Spondylolisthesis at L4-L5 level  -     traMADoL (ULTRAM) 50 mg tablet; Take 1 Tablet by mouth daily as needed for Pain for up to 30 days. Indications: chronic pain  -     gabapentin (NEURONTIN) 400 mg capsule; 1 cap bid and 2 caps q hs  Indications: neuropathic pain    2. Chronic bilateral low back pain with bilateral sciatica  -     traMADoL (ULTRAM) 50 mg tablet; Take 1 Tablet by mouth daily as needed for Pain for up to 30 days. Indications: chronic pain  -     gabapentin (NEURONTIN) 400 mg capsule; 1 cap bid and 2 caps q hs  Indications: neuropathic pain          Follow-up and Dispositions    Return in about 3 months (around 1/18/2023) for with NP Larissa. We have informed Levy Zamora to notify us for immediate appointment if she has any worsening neurogical symptoms or if an emergency situation presents, then call 911      Please note that this dictation was completed with [employer], the computer voice recognition software. Quite often unanticipated grammatical, syntax, homophones, and other interpretive errors are inadvertently transcribed by the computer software. Please disregard these errors. Please excuse any errors that have escaped final proofreading.

## 2022-10-28 DIAGNOSIS — M17.0 BILATERAL PRIMARY OSTEOARTHRITIS OF KNEE: ICD-10-CM

## 2022-10-28 NOTE — TELEPHONE ENCOUNTER
Last Visit: 10/18/22 with NP Larissa  Next Appointment: 1/18/23 with NP Dyer  Previous Refill Encounter(s): 3/7/22 #120 with 2 refills    Requested Prescriptions     Pending Prescriptions Disp Refills    celecoxib (CeleBREX) 200 mg capsule 120 Capsule 2     Sig: Take 1 Capsule by mouth two (2) times a day. For [facility] in place:   Recommendation Provided To:    Intervention Detail: New Rx: 1, reason: Patient Preference  Gap Closed?:   Intervention Accepted By:   Time Spent (min): 5

## 2022-10-31 RX ORDER — CELECOXIB 200 MG/1
200 CAPSULE ORAL 2 TIMES DAILY
Qty: 120 CAPSULE | Refills: 2 | OUTPATIENT
Start: 2022-10-31

## 2023-01-17 ENCOUNTER — TELEPHONE (OUTPATIENT)
Dept: ORTHOPEDIC SURGERY | Age: 78
End: 2023-01-17

## 2023-01-17 NOTE — TELEPHONE ENCOUNTER
Called patient and used the two identifiers. Schedule appointment for 01/26/23 3:40 pm. Virtual appointment.  No further action is needed

## 2023-01-17 NOTE — TELEPHONE ENCOUNTER
Patient called and stated she had an appt with MAXIMINO Smith on 01/19/23, a  , med follow up. She stated she was told the provider would not be in the office that day. She was given the next available, 03/07/23. Her medication will run out before her appt with MAXIMINO Dias. She was put on the Cancellation list.     Please advise patient. She can be reached at [phone].

## 2023-01-26 ENCOUNTER — VIRTUAL VISIT (OUTPATIENT)
Dept: ORTHOPEDIC SURGERY | Age: 78
End: 2023-01-26
Payer: MEDICARE

## 2023-01-26 DIAGNOSIS — M54.42 CHRONIC BILATERAL LOW BACK PAIN WITH BILATERAL SCIATICA: ICD-10-CM

## 2023-01-26 DIAGNOSIS — M54.41 CHRONIC BILATERAL LOW BACK PAIN WITH BILATERAL SCIATICA: ICD-10-CM

## 2023-01-26 DIAGNOSIS — M43.16 SPONDYLOLISTHESIS AT L4-L5 LEVEL: ICD-10-CM

## 2023-01-26 DIAGNOSIS — G89.29 CHRONIC BILATERAL LOW BACK PAIN WITH BILATERAL SCIATICA: ICD-10-CM

## 2023-01-26 RX ORDER — GABAPENTIN 400 MG/1
CAPSULE ORAL
Qty: 120 CAPSULE | Refills: 2 | Status: SHIPPED | OUTPATIENT
Start: 2023-02-16

## 2023-01-26 RX ORDER — TRAMADOL HYDROCHLORIDE 50 MG/1
50 TABLET ORAL
Qty: 60 TABLET | Refills: 0 | Status: SHIPPED | OUTPATIENT
Start: 2023-01-26 | End: 2023-02-25

## 2023-01-26 NOTE — PROGRESS NOTES
History of Present Illness:    Consent: Pankaj Frye, who was seen by telephone call, and/or her healthcare decision maker, is aware that this patient-initiated, Telehealth encounter on 1/26/2023 is a billable service, with coverage as determined by her insurance carrier. She is aware that she may receive a bill and has provided verbal consent to proceed: Yes. The provider was located at the [facility] office and the patient was located at their home. No one else participated in the visit with the patient. The platform used was telephone call    Pankaj Frye is a  76 y.o.  female  who was evaluated  today for follow-up of her chronic low back pain with bilateral buttock pain. She continues to do well on gabapentin 400 mg twice a day and 800 at night. She is not having any side effects from it. She states this is helped her tremendously with her buttock pain and posterior thighs. She has a known spondylolisthesis at L4-5 and does not wish to have any surgery. We give her 1 tramadol a day for her pain  and it is bringing her pain from a 7 down to a 0-2 but does not last her the whole day. She is allergic to NSAIDs but states she takes Celebrex through orthopedics for her knees. She is wanting to know if we could take that over for her. She denies fever bowel bladder dysfunction      Physical Exam: The patient is a 66-year-old female who is alert and oriented. She is verbal fluency. She did not appear to be in distress. Assessment & Plan: This is a patient has a spondylolisthesis at L4-5 that gives her chronic back and leg pain. I did discuss the Celebrex with her and I did not feel comfortable giving it to her at her age. I will increase her tramadol to twice a day to see if that gives her more long-lasting coverage throughout the day. I also refilled her gabapentin. I will see her back in 1 month to see how she is doing on the increased dose of tramadol.          Diagnoses and all orders for this visit:    1. Spondylolisthesis at L4-L5 level  -     gabapentin (NEURONTIN) 400 mg capsule; 1 cap bid and 2 caps q hs  Indications: neuropathic pain  -     traMADoL (Ultram) 50 mg tablet; Take 1 Tablet by mouth two (2) times daily as needed for Pain for up to 30 days. Max Daily Amount: 100 mg. Indications: chronic pain    2. Chronic bilateral low back pain with bilateral sciatica  -     gabapentin (NEURONTIN) 400 mg capsule; 1 cap bid and 2 caps q hs  Indications: neuropathic pain  -     traMADoL (Ultram) 50 mg tablet; Take 1 Tablet by mouth two (2) times daily as needed for Pain for up to 30 days. Max Daily Amount: 100 mg. Indications: chronic pain        Pain Scale: /10      Least pain over the last week has been 2/10. Worst pain over the last week has been 7/10. Opioid Risk Tool Reviewed: YES, Score 0  Aberrant behaviors: None. Urine Drug Screen: 7/19/22 consistent. Controlled substance agreement on file: Yes.  reviewed:yes  Pill count is consistent with his prescription: n/a  Concomitant use of a benzodiazepine: No  MME 5  Narcan not warranted        Risks and benefits of ongoing opiate therapy have been reviewed with the patient. No pain behaviors. Denies thoughts of harming self or others. Pt has a good risk to benefit ratio which allows the pt to function in a home environment without side effects        has been reviewed and is appropriate    Pt has a consistent UDS in the record        We discussed the expected course, resolution and complications of the diagnosis(es) in detail. Medication risks, benefits, costs, interactions, and alternatives were discussed as indicated. I advised her to contact the office if her condition worsens, changes or fails to improve as anticipated. For emergencies or worsening neurological symptoms the patient was instructed to call 911. She expressed understanding with the diagnosis(es) and plan.        Follow-up and Dispositions    Return in about 4 weeks (around 2/23/2023) for In office with nurse practitioner Andrade Ac. Yanique Coombs is a 76 y.o. female being evaluated by a video visit encounter for concerns as above. A caregiver was present when appropriate. Due to this being a TeleHealth encounter (During PVLNS-09 public health emergency), evaluation of the following organ systems was limited: Vitals/Constitutional/EENT/Resp/CV/GI//MS/Neuro/Skin/Heme-Lymph-Imm. Pursuant to the emergency declaration under the [address] [place] [address], [facility] waiver authority and the [employer] Act, this Virtual  Visit was conducted, with patient's (and/or legal guardian's) consent, to reduce the patient's risk of exposure to COVID-19 and provide necessary medical care. CPT Codes 49415-59166 for Established Patients may apply to this Telehealth Visit  Time-based coding, delete if not needed: I spent at least 15 minutes with this established patient, and >50% of the time was spent counseling and/or coordinating care regarding   back and leg pain start time: Is 4:00 PM and Stop time: 4:24 PM.        Due to this being a TeleHealth evaluation, many elements of the physical examination are unable to be assessed. Pursuant to the emergency declaration under the [address] [place] [address], [facility] authority and the [employer] Act, this Virtual  Visit was conducted, with patient's consent, to reduce the patient's risk of exposure to COVID-19 and provide continuity of care for an established patient. Services were provided through a video synchronous discussion virtually to substitute for in-person clinic visit.     Sugar Arnett NP

## 2023-01-27 ENCOUNTER — TELEPHONE (OUTPATIENT)
Dept: ORTHOPEDIC SURGERY | Age: 78
End: 2023-01-27

## 2023-02-20 RX ORDER — CELECOXIB 200 MG/1
CAPSULE ORAL
Qty: 120 CAPSULE | Refills: 2 | Status: SHIPPED | OUTPATIENT
Start: 2023-02-20

## 2023-02-28 ENCOUNTER — OFFICE VISIT (OUTPATIENT)
Age: 78
End: 2023-02-28
Payer: MEDICARE

## 2023-02-28 VITALS
HEART RATE: 69 BPM | RESPIRATION RATE: 18 BRPM | HEIGHT: 65 IN | TEMPERATURE: 97.8 F | BODY MASS INDEX: 32.62 KG/M2 | OXYGEN SATURATION: 96 %

## 2023-02-28 DIAGNOSIS — M43.16 SPONDYLOLISTHESIS, LUMBAR REGION: Primary | ICD-10-CM

## 2023-02-28 DIAGNOSIS — G89.29 CHRONIC RIGHT-SIDED LOW BACK PAIN WITH RIGHT-SIDED SCIATICA: ICD-10-CM

## 2023-02-28 DIAGNOSIS — M54.41 CHRONIC RIGHT-SIDED LOW BACK PAIN WITH RIGHT-SIDED SCIATICA: ICD-10-CM

## 2023-02-28 DIAGNOSIS — Z51.81 ENCOUNTER FOR THERAPEUTIC DRUG LEVEL MONITORING: ICD-10-CM

## 2023-02-28 PROCEDURE — G8400 PT W/DXA NO RESULTS DOC: HCPCS | Performed by: NURSE PRACTITIONER

## 2023-02-28 PROCEDURE — 99213 OFFICE O/P EST LOW 20 MIN: CPT | Performed by: NURSE PRACTITIONER

## 2023-02-28 PROCEDURE — 1036F TOBACCO NON-USER: CPT | Performed by: NURSE PRACTITIONER

## 2023-02-28 PROCEDURE — G8484 FLU IMMUNIZE NO ADMIN: HCPCS | Performed by: NURSE PRACTITIONER

## 2023-02-28 PROCEDURE — 1090F PRES/ABSN URINE INCON ASSESS: CPT | Performed by: NURSE PRACTITIONER

## 2023-02-28 PROCEDURE — G8417 CALC BMI ABV UP PARAM F/U: HCPCS | Performed by: NURSE PRACTITIONER

## 2023-02-28 PROCEDURE — 1123F ACP DISCUSS/DSCN MKR DOCD: CPT | Performed by: NURSE PRACTITIONER

## 2023-02-28 PROCEDURE — G8427 DOCREV CUR MEDS BY ELIG CLIN: HCPCS | Performed by: NURSE PRACTITIONER

## 2023-02-28 RX ORDER — SITAGLIPTIN AND METFORMIN HYDROCHLORIDE 50; 500 MG/1; MG/1
TABLET, FILM COATED, EXTENDED RELEASE ORAL
COMMUNITY

## 2023-02-28 RX ORDER — AMLODIPINE BESYLATE 5 MG/1
TABLET ORAL
COMMUNITY

## 2023-02-28 RX ORDER — TRAMADOL HYDROCHLORIDE 50 MG/1
TABLET ORAL
COMMUNITY

## 2023-02-28 NOTE — PROGRESS NOTES
Chief complaint   Chief Complaint   Patient presents with    Back Pain    Back Problem       History of Present Illness: Soumya Solomon is a  76 y.o.  female   who was evaluated  today for follow-up of her chronic low back pain with bilateral buttock pain. She continues to do well on gabapentin 400 mg twice a day and 800 at night. She is not having any side effects from it. She states this is helped her tremendously with her buttock pain and posterior thighs. She states her pain is more on the right side and leg more than the left currently she has a known spondylolisthesis at L4-5 and does not wish to have any surgery. Last visit we increased her tramadol to 2 times a day. This was bring her pain from a 7 down to a 2 but it was not giving her lasting relief. She states today is working well for her and she is able to function more and walk better and get out of bed without pain. She is allergic to NSAIDs but states she takes Celebrex through orthopedics for her knees. She denies fever bowel bladder dysfunction        Physical Exam: The patient is a 56-year-old female who is alert and oriented. Well-developed well-nourished who is alert and oriented with a normal mood and affect. She has a full weightbearing nonantalgic gait. She utilizes three-point cane. She has 4 out of 5 strength bilateral lower extremities. Negative straight leg raise. She has pain with hyperextension lumbar spine. Assessment & Plan: This is a patient has a spondylolisthesis at L4-5 that gives her chronic back and leg pain. She is doing well on 2 tramadol a day and gabapentin. We will get a UDS today and her last dose of tramadol was today. Further refills will be depend upon results UDS. Lilian Coates was seen today for back pain and back problem.     Diagnoses and all orders for this visit:    Spondylolisthesis, lumbar region  -     Urine Drug Screen    Encounter for therapeutic drug level monitoring  -     Urine Drug Screen    Chronic right-sided low back pain with right-sided sciatica  -     Urine Drug Screen        Return in about 3 months (around 5/28/2023) for fu with RYAN Pearce. who was evaluated  today for follow-up of her chronic low back pain with bilateral buttock pain. She continues to do well on gabapentin 400 mg twice a day and 800 at night. She is not having any side effects from it. She states this is helped her tremendously with her buttock pain and posterior thighs. She has a known spondylolisthesis at L4-5 and does not wish to have any surgery. We give her 1 tramadol a day for her pain  and it is bringing her pain from a 7 down to a 0-2 but does not last her the whole day. She is allergic to NSAIDs but states she takes Celebrex through orthopedics for her knees. She is wanting to know if we could take that over for her. She denies fever bowel bladder dysfunction        Physical Exam: The patient is a 64-year-old female who is alert and oriented. She is verbal fluency. She did not appear to be in distress. Assessment & Plan: This is a patient has a spondylolisthesis at L4-5 that gives her chronic back and leg pain. I did discuss the Celebrex with her and I did not feel comfortable giving it to her at her age. I will increase her tramadol to twice a day to see if that gives her more long-lasting coverage throughout the day. I also refilled her gabapentin. I will see her back in 1 month to see how she is doing on the increased dose of tramadol. Least pain over the last week has been 2/10. Worst pain over the last week has been 7/10. Opioid Risk Tool Reviewed: YES, Score 0  Aberrant behaviors: None. Urine Drug Screen: today. Controlled substance agreement on file: Yes.      reviewed:yes  Pill count is consistent with his prescription: n/a  Concomitant use of a benzodiazepine: No  MME 5,83  Narcan not warranted           Risks and benefits of ongoing opiate therapy have been reviewed with the patient. No pain behaviors. Denies thoughts of harming self or others. Pt has a good risk to benefit ratio which allows the pt to function in a home environment without side effects        has been . reviewed and is appropriate    Pt has a consistent UDS in the record  Medications  Current Outpatient Medications   Medication Sig Dispense Refill    traMADol (ULTRAM) 50 MG tablet tramadol 50 mg tablet   TAKE 1 TABLET BY MOUTH 4 TIMES DAILY FOR 7 DAYS      celecoxib (CELEBREX) 200 MG capsule TAKE 1 CAPSULE BY MOUTH TWICE DAILY 120 capsule 2    empagliflozin (JARDIANCE) 10 MG tablet ceived the following from [facility]: Outside name: Jardiance 10 mg tablet      ergocalciferol (ERGOCALCIFEROL) 1.25 MG (30397 UT) capsule TAKE 1 CAPSULE BY MOUTH ONCE WEEKLY      gabapentin (NEURONTIN) 400 MG capsule 1 cap bid and 2 caps q hs  Indications: neuropathic pain      metoprolol succinate (TOPROL XL) 50 MG extended release tablet TAKE 1 TABLET BY MOUTH EVERY DAY FOR 90 DAYS      rosuvastatin (CRESTOR) 5 MG tablet Take 5 mg by mouth      telmisartan-hydroCHLOROthiazide (MICARDIS HCT) 80-12.5 MG per tablet TAKE 1 TABLET BY MOUTH EVERY DAY      amLODIPine (NORVASC) 5 MG tablet amlodipine 5 mg tablet   TAKE 1 TABLET BY MOUTH EVERY DAY (Patient not taking: Reported on 2/28/2023)      SITagliptin-metFORMIN (JANUMET XR)  MG TB24 per extended release tablet Janumet XR 50 mg-500 mg tablet,extended release (Patient not taking: Reported on 2/28/2023)       No current facility-administered medications for this visit.          Review of systems:    Past Medical History:   Diagnosis Date    Hypertension      Past Surgical History:   Procedure Laterality Date    TUBAL LIGATION       Social History     Socioeconomic History    Marital status:      Spouse name: Not on file    Number of children: Not on file    Years of education: Not on file    Highest education level: Not on file   Occupational History    Not on file   Tobacco Use    Smoking status: Never    Smokeless tobacco: Never   Substance and Sexual Activity    Alcohol use: Not on file    Drug use: Not on file    Sexual activity: Not on file   Other Topics Concern    Not on file   Social History Narrative    Not on file     Social Determinants of Health     Financial Resource Strain: Not on file   Food Insecurity: Not on file   Transportation Needs: Not on file   Physical Activity: Not on file   Stress: Not on file   Social Connections: Not on file   Intimate Partner Violence: Not on file   Housing Stability: Not on file     History reviewed. No pertinent family history. Physical Exam:  Pulse 69   Temp 97.8 °F (36.6 °C) (Temporal)   Resp 18   Ht 5' 5\" (1.651 m)   SpO2 96% Comment: RA  BMI 32.62 kg/m²   Pain Scale: 5/10      We have informed Mary Barr to notify us for immediate appointment if she has any worsening neurogical symptoms or if an emergency situation presents, then call 911      Please note that this dictation was completed with [employer], the computer voice recognition software. Quite often unanticipated grammatical, syntax, homophones, and other interpretive errors are inadvertently transcribed by the computer software. Please disregard these errors. Please excuse any errors that have escaped final proofreading.      ROYAL Mckinney NP

## 2023-02-28 NOTE — PROGRESS NOTES
Isabell Maxwell presents today for   Chief Complaint   Patient presents with    Back Pain    Back Problem       Is someone accompanying this pt? no    Is the patient using any DME equipment during OV? Yes cane     Depression Screening:  No flowsheet data found.    Learning Assessment:  No flowsheet data found.    Abuse Screening:  No flowsheet data found.    Fall Risk  No flowsheet data found.    OPIOID RISK TOOL  No flowsheet data found.    Coordination of Care:  1. Have you been to the ER, urgent care clinic since your last visit? no  Hospitalized since your last visit? no    2. Have you seen or consulted any other health care providers outside of the [facility] since your last visit? no Include any pap smears or colon screening. no

## 2023-03-22 DIAGNOSIS — M54.41 CHRONIC RIGHT-SIDED LOW BACK PAIN WITH RIGHT-SIDED SCIATICA: ICD-10-CM

## 2023-03-22 DIAGNOSIS — G89.29 CHRONIC RIGHT-SIDED LOW BACK PAIN WITH RIGHT-SIDED SCIATICA: ICD-10-CM

## 2023-03-22 DIAGNOSIS — M43.16 SPONDYLOLISTHESIS, LUMBAR REGION: Primary | ICD-10-CM

## 2023-03-22 RX ORDER — TRAMADOL HYDROCHLORIDE 50 MG/1
TABLET ORAL
Qty: 60 TABLET | Refills: 1 | Status: SHIPPED | OUTPATIENT
Start: 2023-03-22 | End: 2023-05-21

## 2023-03-28 DIAGNOSIS — M54.41 CHRONIC RIGHT-SIDED LOW BACK PAIN WITH RIGHT-SIDED SCIATICA: ICD-10-CM

## 2023-03-28 DIAGNOSIS — M43.16 SPONDYLOLISTHESIS, LUMBAR REGION: Primary | ICD-10-CM

## 2023-03-28 DIAGNOSIS — G89.29 CHRONIC RIGHT-SIDED LOW BACK PAIN WITH RIGHT-SIDED SCIATICA: ICD-10-CM

## 2023-03-28 RX ORDER — TRAMADOL HYDROCHLORIDE 50 MG/1
50 TABLET ORAL 2 TIMES DAILY PRN
Qty: 60 TABLET | Refills: 0 | Status: SHIPPED | OUTPATIENT
Start: 2023-05-19 | End: 2023-06-18

## 2023-03-28 NOTE — TELEPHONE ENCOUNTER
The pt's UDS results were consistent. A  was obtained and there were no aberrancies noted. The last fill date for tramadol was 03/22/23. This prescription should have one remaining refill on it. That can be filled 04/20. A prescription for May has been pended to the provider for review. The pt has a follow up scheduled for 5/31.

## 2023-06-02 ENCOUNTER — OFFICE VISIT (OUTPATIENT)
Age: 78
End: 2023-06-02
Payer: MEDICARE

## 2023-06-02 VITALS
HEART RATE: 58 BPM | TEMPERATURE: 97.2 F | BODY MASS INDEX: 32.15 KG/M2 | HEIGHT: 65 IN | OXYGEN SATURATION: 96 % | WEIGHT: 193 LBS

## 2023-06-02 DIAGNOSIS — M54.41 CHRONIC RIGHT-SIDED LOW BACK PAIN WITH RIGHT-SIDED SCIATICA: ICD-10-CM

## 2023-06-02 DIAGNOSIS — G89.29 CHRONIC RIGHT-SIDED LOW BACK PAIN WITH RIGHT-SIDED SCIATICA: ICD-10-CM

## 2023-06-02 DIAGNOSIS — M43.16 SPONDYLOLISTHESIS, LUMBAR REGION: Primary | ICD-10-CM

## 2023-06-02 PROCEDURE — 1036F TOBACCO NON-USER: CPT | Performed by: NURSE PRACTITIONER

## 2023-06-02 PROCEDURE — G8399 PT W/DXA RESULTS DOCUMENT: HCPCS | Performed by: NURSE PRACTITIONER

## 2023-06-02 PROCEDURE — G8427 DOCREV CUR MEDS BY ELIG CLIN: HCPCS | Performed by: NURSE PRACTITIONER

## 2023-06-02 PROCEDURE — 99214 OFFICE O/P EST MOD 30 MIN: CPT | Performed by: NURSE PRACTITIONER

## 2023-06-02 PROCEDURE — G8417 CALC BMI ABV UP PARAM F/U: HCPCS | Performed by: NURSE PRACTITIONER

## 2023-06-02 PROCEDURE — 1123F ACP DISCUSS/DSCN MKR DOCD: CPT | Performed by: NURSE PRACTITIONER

## 2023-06-02 PROCEDURE — 1090F PRES/ABSN URINE INCON ASSESS: CPT | Performed by: NURSE PRACTITIONER

## 2023-06-02 RX ORDER — TRAMADOL HYDROCHLORIDE 50 MG/1
50 TABLET ORAL 2 TIMES DAILY PRN
Qty: 60 TABLET | Refills: 2 | Status: SHIPPED | OUTPATIENT
Start: 2023-06-02 | End: 2023-08-31

## 2023-06-02 RX ORDER — PREGABALIN 75 MG/1
CAPSULE ORAL
Qty: 60 CAPSULE | Refills: 1 | Status: SHIPPED | OUTPATIENT
Start: 2023-06-02 | End: 2023-08-01

## 2023-06-02 NOTE — PROGRESS NOTES
aMrily Kaufman presents today for   Chief Complaint   Patient presents with    Back Pain    Knee Pain     Left       Is someone accompanying this pt? no    Is the patient using any DME equipment during OV? Yes, cane    Depression Screening:  PHQ-9 Questionaire 1/31/2022   Little interest or pleasure in doing things 0   Feeling down, depressed, or hopeless 0   PHQ-9 Total Score 0     PHQ Scores 1/31/2022   PHQ2 Score 0   PHQ2 Score 0   PHQ9 Score 0         Coordination of Care:  1. Have you been to the ER, urgent care clinic since your last visit? no  Hospitalized since your last visit? no    2. Have you seen or consulted any other health care providers outside of the [address] [place] [address] since your last visit? Yes, cane Include any pap smears or colon screening.  no    Last  Checked 06/02/23  Last UDS Checked 02/28/23  Last Pain Agreement Signed -
pregabalin (LYRICA) 75 MG capsule; 1 cap bid x 1 week then 2 caps bid  -     traMADol (ULTRAM) 50 MG tablet; Take 1 tablet by mouth 2 times daily as needed for Pain for up to 90 days. Max Daily Amount: 100 mg    Chronic right-sided low back pain with right-sided sciatica  -     pregabalin (LYRICA) 75 MG capsule; 1 cap bid x 1 week then 2 caps bid  -     traMADol (ULTRAM) 50 MG tablet; Take 1 tablet by mouth 2 times daily as needed for Pain for up to 90 days. Max Daily Amount: 100 mg        Return in about 6 weeks (around 7/14/2023) for fu with NP Porter.  has been . reviewed and is appropriate    Medications:  Current Outpatient Medications   Medication Sig Dispense Refill    pregabalin (LYRICA) 75 MG capsule 1 cap bid x 1 week then 2 caps bid 60 capsule 1    traMADol (ULTRAM) 50 MG tablet Take 1 tablet by mouth 2 times daily as needed for Pain for up to 90 days. Max Daily Amount: 100 mg 60 tablet 2    celecoxib (CELEBREX) 200 MG capsule TAKE 1 CAPSULE BY MOUTH TWICE DAILY 120 capsule 2    empagliflozin (JARDIANCE) 10 MG tablet Take 1 tablet by mouth daily      ergocalciferol (ERGOCALCIFEROL) 1.25 MG (77001 UT) capsule TAKE 1 CAPSULE BY MOUTH ONCE WEEKLY      gabapentin (NEURONTIN) 400 MG capsule 1 cap bid and 2 caps q hs  Indications: neuropathic pain      metoprolol succinate (TOPROL XL) 50 MG extended release tablet Take 1 tablet by mouth daily      rosuvastatin (CRESTOR) 5 MG tablet Take 1 tablet by mouth daily      telmisartan-hydroCHLOROthiazide (MICARDIS HCT) 80-12.5 MG per tablet TAKE 1 TABLET BY MOUTH EVERY DAY      amLODIPine (NORVASC) 5 MG tablet       SITagliptin-metFORMIN (JANUMET XR)  MG TB24 per extended release tablet        No current facility-administered medications for this visit.        Review of systems:    Past Medical History:   Diagnosis Date    Hypertension      Past Surgical History:   Procedure Laterality Date    TUBAL LIGATION       Social History     Socioeconomic History

## 2023-06-05 DIAGNOSIS — M43.16 SPONDYLOLISTHESIS, LUMBAR REGION: ICD-10-CM

## 2023-06-05 DIAGNOSIS — G89.29 CHRONIC RIGHT-SIDED LOW BACK PAIN WITH RIGHT-SIDED SCIATICA: ICD-10-CM

## 2023-06-05 DIAGNOSIS — M54.41 CHRONIC RIGHT-SIDED LOW BACK PAIN WITH RIGHT-SIDED SCIATICA: ICD-10-CM

## 2023-06-05 DIAGNOSIS — Z51.81 ENCOUNTER FOR THERAPEUTIC DRUG LEVEL MONITORING: ICD-10-CM

## 2023-07-19 ENCOUNTER — OFFICE VISIT (OUTPATIENT)
Age: 78
End: 2023-07-19
Payer: MEDICARE

## 2023-07-19 VITALS
HEIGHT: 65 IN | BODY MASS INDEX: 32.15 KG/M2 | HEART RATE: 72 BPM | OXYGEN SATURATION: 94 % | RESPIRATION RATE: 18 BRPM | TEMPERATURE: 97.6 F | WEIGHT: 193 LBS

## 2023-07-19 DIAGNOSIS — G89.29 CHRONIC RIGHT-SIDED LOW BACK PAIN WITH RIGHT-SIDED SCIATICA: ICD-10-CM

## 2023-07-19 DIAGNOSIS — M43.16 SPONDYLOLISTHESIS, LUMBAR REGION: Primary | ICD-10-CM

## 2023-07-19 DIAGNOSIS — M54.41 CHRONIC RIGHT-SIDED LOW BACK PAIN WITH RIGHT-SIDED SCIATICA: ICD-10-CM

## 2023-07-19 PROCEDURE — G8417 CALC BMI ABV UP PARAM F/U: HCPCS | Performed by: NURSE PRACTITIONER

## 2023-07-19 PROCEDURE — 1036F TOBACCO NON-USER: CPT | Performed by: NURSE PRACTITIONER

## 2023-07-19 PROCEDURE — G8399 PT W/DXA RESULTS DOCUMENT: HCPCS | Performed by: NURSE PRACTITIONER

## 2023-07-19 PROCEDURE — 1123F ACP DISCUSS/DSCN MKR DOCD: CPT | Performed by: NURSE PRACTITIONER

## 2023-07-19 PROCEDURE — 1090F PRES/ABSN URINE INCON ASSESS: CPT | Performed by: NURSE PRACTITIONER

## 2023-07-19 PROCEDURE — G8427 DOCREV CUR MEDS BY ELIG CLIN: HCPCS | Performed by: NURSE PRACTITIONER

## 2023-07-19 PROCEDURE — 99214 OFFICE O/P EST MOD 30 MIN: CPT | Performed by: NURSE PRACTITIONER

## 2023-07-19 RX ORDER — PREGABALIN 150 MG/1
150 CAPSULE ORAL 2 TIMES DAILY
Qty: 60 CAPSULE | Refills: 2 | Status: SHIPPED | OUTPATIENT
Start: 2023-07-19 | End: 2023-10-17

## 2023-07-19 NOTE — PROGRESS NOTES
Lan Huitron presents today for   Chief Complaint   Patient presents with    Back Problem    Pain    Back Pain       Is someone accompanying this pt? NO    Is the patient using any DME equipment during OV? YES CANE    Depression Screening:  No flowsheet data found. Learning Assessment:  No flowsheet data found. Abuse Screening:  No flowsheet data found. Fall Risk  No flowsheet data found. OPIOID RISK TOOL  No flowsheet data found. Coordination of Care:  1. Have you been to the ER, urgent care clinic since your last visit? NO  Hospitalized since your last visit? NO    2. Have you seen or consulted any other health care providers outside of the [address] [place] [address] since your last visit? NO Include any pap smears or colon screening.  NO

## 2023-07-19 NOTE — PROGRESS NOTES
Chief complaint   Chief Complaint   Patient presents with    Back Problem    Pain    Back Pain       History of Present Illness: Zach Mujica is a  78 y.o.  female   who comes in today for follow-up of her chronic low back pain with bilateral buttock pain. The right side is worse than the left. This visit we weaned her off her gabapentin and started her on Lyrica and tapered her up to 150 mg twice a day. She states it is helped tremendously and a whole lot better than the gabapentin. She is not really having any side effects. She is very happy with that and has even been able to only take her tramadol as needed now. She was taking it twice a day. She has a known spondylolisthesis at L4-5 and does not wish to have any surgery. She is diabetic and states her blood sugars are controlled with a last A1c of 5.2. She denies fever bowel bladder dysfunction        Physical Exam: The patient is a 78-year-old female who is alert and oriented. Well-developed well-nourished who is alert and oriented with a normal mood and affect. She has a full weightbearing nonantalgic gait. She utilizes three-point cane. She has 4 out of 5 strength bilateral lower extremities. Negative straight leg raise. She has pain with hyperextension lumbar spine. Assessment & Plan: This is a patient has a spondylolisthesis at L4-5 that gives her chronic back and leg pain. She is doing very well on Lyrica 150 twice a day. I refilled that for her. She does not need the tramadol and is only taking it as needed. She will continue to try to keep her blood sugar under good control so as not to damage her lower extremity nerves. I will see her back in 3 months sooner if needed. Olena Chao was seen today for back problem, pain and back pain. Diagnoses and all orders for this visit:    Spondylolisthesis, lumbar region  -     pregabalin (LYRICA) 150 MG capsule; Take 1 capsule by mouth 2 times daily for 90 days.  Max Daily Amount: 300

## 2023-08-22 RX ORDER — CELECOXIB 200 MG/1
CAPSULE ORAL
Qty: 120 CAPSULE | Refills: 2 | Status: SHIPPED | OUTPATIENT
Start: 2023-08-22

## 2023-10-17 ENCOUNTER — OFFICE VISIT (OUTPATIENT)
Age: 78
End: 2023-10-17
Payer: MEDICARE

## 2023-10-17 VITALS
RESPIRATION RATE: 18 BRPM | WEIGHT: 207.4 LBS | HEIGHT: 65 IN | HEART RATE: 66 BPM | OXYGEN SATURATION: 95 % | BODY MASS INDEX: 34.55 KG/M2 | TEMPERATURE: 98.2 F

## 2023-10-17 DIAGNOSIS — G89.29 OTHER CHRONIC PAIN: Primary | ICD-10-CM

## 2023-10-17 DIAGNOSIS — G89.29 CHRONIC RIGHT-SIDED LOW BACK PAIN WITH RIGHT-SIDED SCIATICA: ICD-10-CM

## 2023-10-17 DIAGNOSIS — M54.41 CHRONIC RIGHT-SIDED LOW BACK PAIN WITH RIGHT-SIDED SCIATICA: ICD-10-CM

## 2023-10-17 DIAGNOSIS — M43.16 SPONDYLOLISTHESIS, LUMBAR REGION: ICD-10-CM

## 2023-10-17 DIAGNOSIS — Z79.891 ENCOUNTER FOR LONG-TERM USE OF OPIATE ANALGESIC: ICD-10-CM

## 2023-10-17 PROCEDURE — 1036F TOBACCO NON-USER: CPT | Performed by: NURSE PRACTITIONER

## 2023-10-17 PROCEDURE — G8427 DOCREV CUR MEDS BY ELIG CLIN: HCPCS | Performed by: NURSE PRACTITIONER

## 2023-10-17 PROCEDURE — 1090F PRES/ABSN URINE INCON ASSESS: CPT | Performed by: NURSE PRACTITIONER

## 2023-10-17 PROCEDURE — 99214 OFFICE O/P EST MOD 30 MIN: CPT | Performed by: NURSE PRACTITIONER

## 2023-10-17 PROCEDURE — 1123F ACP DISCUSS/DSCN MKR DOCD: CPT | Performed by: NURSE PRACTITIONER

## 2023-10-17 PROCEDURE — G8417 CALC BMI ABV UP PARAM F/U: HCPCS | Performed by: NURSE PRACTITIONER

## 2023-10-17 PROCEDURE — G8399 PT W/DXA RESULTS DOCUMENT: HCPCS | Performed by: NURSE PRACTITIONER

## 2023-10-17 PROCEDURE — G8484 FLU IMMUNIZE NO ADMIN: HCPCS | Performed by: NURSE PRACTITIONER

## 2023-10-17 RX ORDER — PREGABALIN 150 MG/1
150 CAPSULE ORAL 2 TIMES DAILY
Qty: 60 CAPSULE | Refills: 2 | Status: SHIPPED | OUTPATIENT
Start: 2023-10-17 | End: 2024-01-15

## 2023-10-17 RX ORDER — ACETAMINOPHEN AND CODEINE PHOSPHATE 300; 30 MG/1; MG/1
1 TABLET ORAL DAILY PRN
Qty: 30 TABLET | Refills: 0 | Status: SHIPPED | OUTPATIENT
Start: 2023-10-17 | End: 2023-11-16

## 2023-10-17 NOTE — PROGRESS NOTES
Omar Grace presents today for   Chief Complaint   Patient presents with    Back Problem    Pain    Back Pain       Is someone accompanying this pt? no    Is the patient using any DME equipment during OV? no    Depression Screening:       No data to display                Learning Assessment:  No flowsheet data found. Abuse Screening:       No data to display                Fall Risk  No flowsheet data found. OPIOID RISK TOOL  No flowsheet data found. Coordination of Care:  1. Have you been to the ER, urgent care clinic since your last visit? no  Hospitalized since your last visit? no    2. Have you seen or consulted any other health care providers outside of the [address] [place] [address] since your last visit? no Include any pap smears or colon screening.  no

## 2023-10-17 NOTE — PROGRESS NOTES
Chief complaint   Chief Complaint   Patient presents with    Back Problem    Pain    Back Pain       History of Present Illness: Priti Barton is a  78 y.o.  female  who comes in today for follow-up of her chronic low back pain with bilateral buttock pain. The right side is worse than the left. She is on Lyrica  150 mg twice a day. She states it does help which she would like to continue it. She is not having any side effects. For her back pain she was taking tramadol from time to time but the pharmacies have been out of it and she has been unable to have any in quite a few weeks. She states she does need something for her back so she can get up and do things around the house such as cleaning. She has a known spondylolisthesis at L4-5 and does not wish to have any surgery. She is diabetic and states her blood sugars are controlled with a last A1c of 5.2 and her last blood sugar check was 95. She denies fever bowel bladder dysfunction        Physical Exam: The patient is a 78-year-old female who is alert and oriented. Well-developed well-nourished who is alert and oriented with a normal mood and affect. She has a full weightbearing nonantalgic gait. She utilizes three-point cane. She has 4 out of 5 strength bilateral lower extremities. Negative straight leg raise. She has pain with hyperextension lumbar spine. Assessment & Plan: This is a patient has a spondylolisthesis at L4-5 that gives her chronic back and leg pain. She is doing very well on Lyrica 150 twice a day. I refilled that for her. I will change her tramadol to Tylenol 3 and give her enough for 1 a day. I will discontinue the tramadol. We are getting a UDS today and her last dose of tramadol was a few weeks ago. I will see her back in 3 weeks on a virtual visit to see how she is doing on the Tylenol 3. Celester Reasons was seen today for back problem, pain and back pain.     Diagnoses and all orders for this visit:    Other chronic

## 2023-11-07 ENCOUNTER — TELEMEDICINE (OUTPATIENT)
Age: 78
End: 2023-11-07
Payer: MEDICARE

## 2023-11-07 DIAGNOSIS — G89.29 OTHER CHRONIC PAIN: ICD-10-CM

## 2023-11-07 DIAGNOSIS — G89.29 CHRONIC RIGHT-SIDED LOW BACK PAIN WITH RIGHT-SIDED SCIATICA: ICD-10-CM

## 2023-11-07 DIAGNOSIS — M54.41 CHRONIC RIGHT-SIDED LOW BACK PAIN WITH RIGHT-SIDED SCIATICA: ICD-10-CM

## 2023-11-07 DIAGNOSIS — M43.16 SPONDYLOLISTHESIS, LUMBAR REGION: ICD-10-CM

## 2023-11-07 PROCEDURE — 99442 PR PHYS/QHP TELEPHONE EVALUATION 11-20 MIN: CPT | Performed by: NURSE PRACTITIONER

## 2023-11-07 RX ORDER — ACETAMINOPHEN AND CODEINE PHOSPHATE 300; 30 MG/1; MG/1
1 TABLET ORAL DAILY PRN
Qty: 30 TABLET | Refills: 1 | Status: SHIPPED | OUTPATIENT
Start: 2023-11-16 | End: 2024-01-15

## 2023-11-07 NOTE — PROGRESS NOTES
Clau Davila presents today for   Chief Complaint   Patient presents with    Back Problem    Pain    Back Pain       Is someone accompanying this pt? no    Is the patient using any DME equipment during OV? no    Depression Screening:       No data to display                Learning Assessment:  No flowsheet data found. Abuse Screening:       No data to display                Fall Risk  No flowsheet data found. OPIOID RISK TOOL  No flowsheet data found. Coordination of Care:  1. Have you been to the ER, urgent care clinic since your last visit? no  Hospitalized since your last visit? no    2. Have you seen or consulted any other health care providers outside of the [address] [place] [address] since your last visit? no Include any pap smears or colon screening.  no

## 2023-11-07 NOTE — PROGRESS NOTES
Macy Sheppard is a 78 y.o. female evaluated via telephone on 11/7/2023 for Back Problem, Pain, and Back Pain  . History of Present Illness: Macy Sheppard is a  78 y.o.  female  who is being evaluated today for follow-up of her chronic low back pain with bilateral buttock pain. The right side is worse than the left. She is on Lyrica  150 mg twice a day. She states it does help and she tolerates that well. She is not having any side effects. For her back pain she was taking tramadol from time to time but the pharmacies have been out of it so we changed her to Tylenol 3 once a day. She states it does bring her pain level from a 7 down to a 2. Initially she had some dizziness and stomach upset with that but that has settled out now. With this medication she is able to do things around the house such as cleaning. She has a known spondylolisthesis at L4-5 and does not wish to have any surgery. She is diabetic and states her blood sugars are controlled with a last A1c of 5.2. She denies fever bowel bladder dysfunction        Physical Exam: The patient is a 78-year-old female who is alert and oriented. She spoke with fluency. She did not appear to be in distress. Assessment & Plan: This is a patient has a spondylolisthesis at L4-5 that gives her chronic back and leg pain. She is doing very well on Lyrica 150 twice a day. She does not need a refill of that at this time. I will go ahead and continue her Tylenol 3 at once a day. Have given her a new prescription with a refill. I will see her back in 2 months sooner if needed. Jennifer Allen was seen today for back problem, pain and back pain. Diagnoses and all orders for this visit:    Other chronic pain  -     acetaminophen-codeine (TYLENOL/CODEINE #3) 300-30 MG per tablet; Take 1 tablet by mouth daily as needed for Pain for up to 60 days. Intended supply: 3 days.  Take lowest dose possible to manage pain Max Daily Amount: 1

## 2024-01-09 ENCOUNTER — OFFICE VISIT (OUTPATIENT)
Age: 79
End: 2024-01-09
Payer: MEDICARE

## 2024-01-09 VITALS
TEMPERATURE: 98.1 F | BODY MASS INDEX: 35.02 KG/M2 | HEIGHT: 65 IN | OXYGEN SATURATION: 96 % | HEART RATE: 80 BPM | WEIGHT: 210.2 LBS | RESPIRATION RATE: 18 BRPM

## 2024-01-09 DIAGNOSIS — M43.16 SPONDYLOLISTHESIS, LUMBAR REGION: Primary | ICD-10-CM

## 2024-01-09 DIAGNOSIS — G89.29 CHRONIC RIGHT-SIDED LOW BACK PAIN WITH RIGHT-SIDED SCIATICA: ICD-10-CM

## 2024-01-09 DIAGNOSIS — M54.42 CHRONIC BILATERAL LOW BACK PAIN WITH BILATERAL SCIATICA: ICD-10-CM

## 2024-01-09 DIAGNOSIS — M54.41 CHRONIC RIGHT-SIDED LOW BACK PAIN WITH RIGHT-SIDED SCIATICA: ICD-10-CM

## 2024-01-09 DIAGNOSIS — M54.41 CHRONIC BILATERAL LOW BACK PAIN WITH BILATERAL SCIATICA: ICD-10-CM

## 2024-01-09 DIAGNOSIS — G89.29 CHRONIC BILATERAL LOW BACK PAIN WITH BILATERAL SCIATICA: ICD-10-CM

## 2024-01-09 PROCEDURE — G8484 FLU IMMUNIZE NO ADMIN: HCPCS | Performed by: NURSE PRACTITIONER

## 2024-01-09 PROCEDURE — G8399 PT W/DXA RESULTS DOCUMENT: HCPCS | Performed by: NURSE PRACTITIONER

## 2024-01-09 PROCEDURE — 99214 OFFICE O/P EST MOD 30 MIN: CPT | Performed by: NURSE PRACTITIONER

## 2024-01-09 PROCEDURE — 1090F PRES/ABSN URINE INCON ASSESS: CPT | Performed by: NURSE PRACTITIONER

## 2024-01-09 PROCEDURE — 1123F ACP DISCUSS/DSCN MKR DOCD: CPT | Performed by: NURSE PRACTITIONER

## 2024-01-09 PROCEDURE — G8427 DOCREV CUR MEDS BY ELIG CLIN: HCPCS | Performed by: NURSE PRACTITIONER

## 2024-01-09 PROCEDURE — G8417 CALC BMI ABV UP PARAM F/U: HCPCS | Performed by: NURSE PRACTITIONER

## 2024-01-09 PROCEDURE — 1036F TOBACCO NON-USER: CPT | Performed by: NURSE PRACTITIONER

## 2024-01-09 RX ORDER — PREGABALIN 150 MG/1
150 CAPSULE ORAL 2 TIMES DAILY
Qty: 60 CAPSULE | Refills: 5 | Status: SHIPPED | OUTPATIENT
Start: 2024-01-09 | End: 2024-07-07

## 2024-01-09 RX ORDER — TRAMADOL HYDROCHLORIDE 50 MG/1
50 TABLET ORAL 2 TIMES DAILY PRN
Qty: 60 TABLET | Refills: 0 | Status: SHIPPED | OUTPATIENT
Start: 2024-01-09 | End: 2024-02-08

## 2024-01-30 ENCOUNTER — TELEMEDICINE (OUTPATIENT)
Age: 79
End: 2024-01-30
Payer: MEDICARE

## 2024-01-30 DIAGNOSIS — G89.29 CHRONIC BILATERAL LOW BACK PAIN WITH BILATERAL SCIATICA: ICD-10-CM

## 2024-01-30 DIAGNOSIS — M43.16 SPONDYLOLISTHESIS, LUMBAR REGION: ICD-10-CM

## 2024-01-30 DIAGNOSIS — M54.41 CHRONIC BILATERAL LOW BACK PAIN WITH BILATERAL SCIATICA: ICD-10-CM

## 2024-01-30 DIAGNOSIS — M54.42 CHRONIC BILATERAL LOW BACK PAIN WITH BILATERAL SCIATICA: ICD-10-CM

## 2024-01-30 PROCEDURE — 99442 PR PHYS/QHP TELEPHONE EVALUATION 11-20 MIN: CPT | Performed by: NURSE PRACTITIONER

## 2024-01-30 RX ORDER — TRAMADOL HYDROCHLORIDE 50 MG/1
50 TABLET ORAL 2 TIMES DAILY PRN
Qty: 60 TABLET | Refills: 2 | Status: SHIPPED | OUTPATIENT
Start: 2024-02-09 | End: 2024-05-09

## 2024-01-30 NOTE — PROGRESS NOTES
Isabell Maxwell presents today for   Chief Complaint   Patient presents with    Back Problem    Pain    Back Pain       Is someone accompanying this pt? no  no  Is the patient using any DME equipment during OV? no    Depression Screening:       No data to display                Learning Assessment:      Abuse Screening:       No data to display                Fall Risk      OPIOID RISK TOOL      Coordination of Care:  1. Have you been to the ER, urgent care clinic since your last visit? no  Hospitalized since your last visit? no    2. Have you seen or consulted any other health care providers outside of the [facility] since your last visit? no Include any pap smears or colon screening. no

## 2024-01-30 NOTE — PROGRESS NOTES
Isabell Maxwell is a 78 y.o. female evaluated via telephone on 1/30/2024 for Back Problem, Pain, and Back Pain  .      History of Present Illness: The patient is a 78-year-old female is being evaluated for her low back pain with bilateral buttock pain.  She is on Lyrica  150 mg twice a day.  She states it does help.   She is not having any side effects.  For her back pain she was taking Tylenol 3 but is becoming effective for her.  Her pain level was a 7 out of 10.  We put her back on tramadol 50 mg twice a day.  She is taking it 1-2 times a day and she states it brings her pain from a 7 down to a 0-1 out of 10.  She has a known spondylolisthesis at L4-5 and does not wish to have any surgery.  She is diabetic and states her blood sugars are controlled with a last A1c of 5.7. She denies fever bowel bladder dysfunction        Physical Exam: The patient is a 78-year-old female who was alert and oriented.  She spoke with fluency.  She did not appear to be in distress.      Assessment & Plan: This is a patient has a spondylolisthesis at L4-5 that gives her chronic back and leg pain.   She is doing very well on Lyrica 150 twice a day.  She did not need a refill of that at this time.  She is also doing well on tramadol and I encouraged her to take it twice a day so she would be more functional.  She agrees to do so.  I have given her refills of the tramadol.  We will see her back in 3 months sooner if needed.  Thank you call Isabell Maxwell and get her an appointment for 3 months in the office with me.      Isabell was seen today for back problem, pain and back pain.    Diagnoses and all orders for this visit:    Spondylolisthesis, lumbar region  -     traMADol (ULTRAM) 50 MG tablet; Take 1 tablet by mouth 2 times daily as needed for Pain for up to 90 days. Intended supply: 3 days. Take lowest dose possible to manage pain Max Daily Amount: 100 mg    Chronic bilateral low back pain with bilateral sciatica  -     traMADol (ULTRAM) 50

## 2024-02-20 RX ORDER — CELECOXIB 200 MG/1
CAPSULE ORAL
Qty: 120 CAPSULE | Refills: 2 | Status: SHIPPED | OUTPATIENT
Start: 2024-02-20

## 2024-04-30 ENCOUNTER — OFFICE VISIT (OUTPATIENT)
Age: 79
End: 2024-04-30
Payer: MEDICARE

## 2024-04-30 VITALS
OXYGEN SATURATION: 99 % | BODY MASS INDEX: 34.82 KG/M2 | HEIGHT: 65 IN | TEMPERATURE: 97.3 F | WEIGHT: 209 LBS | HEART RATE: 63 BPM

## 2024-04-30 DIAGNOSIS — M43.16 SPONDYLOLISTHESIS, LUMBAR REGION: Primary | ICD-10-CM

## 2024-04-30 DIAGNOSIS — G89.29 CHRONIC BILATERAL LOW BACK PAIN WITH BILATERAL SCIATICA: ICD-10-CM

## 2024-04-30 DIAGNOSIS — M54.42 CHRONIC BILATERAL LOW BACK PAIN WITH BILATERAL SCIATICA: ICD-10-CM

## 2024-04-30 DIAGNOSIS — Z79.891 ENCOUNTER FOR LONG-TERM USE OF OPIATE ANALGESIC: ICD-10-CM

## 2024-04-30 DIAGNOSIS — M54.41 CHRONIC BILATERAL LOW BACK PAIN WITH BILATERAL SCIATICA: ICD-10-CM

## 2024-04-30 PROCEDURE — G8399 PT W/DXA RESULTS DOCUMENT: HCPCS | Performed by: NURSE PRACTITIONER

## 2024-04-30 PROCEDURE — 1123F ACP DISCUSS/DSCN MKR DOCD: CPT | Performed by: NURSE PRACTITIONER

## 2024-04-30 PROCEDURE — 99214 OFFICE O/P EST MOD 30 MIN: CPT | Performed by: NURSE PRACTITIONER

## 2024-04-30 PROCEDURE — G8417 CALC BMI ABV UP PARAM F/U: HCPCS | Performed by: NURSE PRACTITIONER

## 2024-04-30 PROCEDURE — 1090F PRES/ABSN URINE INCON ASSESS: CPT | Performed by: NURSE PRACTITIONER

## 2024-04-30 PROCEDURE — 1036F TOBACCO NON-USER: CPT | Performed by: NURSE PRACTITIONER

## 2024-04-30 PROCEDURE — G8427 DOCREV CUR MEDS BY ELIG CLIN: HCPCS | Performed by: NURSE PRACTITIONER

## 2024-04-30 RX ORDER — FUROSEMIDE 20 MG/1
20 TABLET ORAL DAILY PRN
COMMUNITY
Start: 2024-01-30

## 2024-04-30 RX ORDER — TELMISARTAN 80 MG/1
80 TABLET ORAL DAILY
COMMUNITY
Start: 2024-04-26

## 2024-04-30 RX ORDER — PREGABALIN 150 MG/1
150 CAPSULE ORAL 2 TIMES DAILY
Qty: 60 CAPSULE | Refills: 0 | Status: SHIPPED | OUTPATIENT
Start: 2024-05-24 | End: 2024-11-20

## 2024-04-30 NOTE — PROGRESS NOTES
Chief complaint   Chief Complaint   Patient presents with    Lower Back Pain       History of Present Illness:  Isabell Maxwell is a  78 y.o.  female who is being evaluated for her low back pain with bilateral buttock pain.  She is on Lyrica  150 mg twice a day.  She states it does help with her leg pain.  On the left it will go down into her buttock and on the right down to her calf.  She is not having any side effects.  For her back pain she takes tramadol 50 mg twice a day.  Today she is states she usually takes 1 a day and that occasionally she will have to take that second dose.  She states it brings her pain from a 7 down to a 0-1 out of 10.  She has a known spondylolisthesis at L4-5 and does not wish to have any surgery.  She is diabetic and states her blood sugars are controlled with a last A1c of 5.3. She denies fever bowel bladder dysfunction        Physical Exam: The patient is a 78-year-old female well-developed well-nourished who is alert and oriented with a normal mood and affect.  She has a slow but full weightbearing nonantalgic gait.  She denies assist device.  She has 4 out of 5 strength bilateral lower extremities.  Negative straight leg raise.  She has pain with hyperextension lumbar spine.     Assessment & Plan: This is a patient has a spondylolisthesis at L4-5 that gives her chronic back and leg pain.   She is doing very well on Lyrica 150 twice a day.  I gave her 1 refill of it.  She does not need refills of her tramadol as we have been giving her 60 a month so she still has some refills left.  We will likely reduce that down to 40/month since she only takes that second dose occasionally.  We are getting a urine drug screen today.  Her last dose of tramadol was today as well as her Lyrica.  Will see her back in 3 months sooner if needed.  We did talk about her blood sugar and she is keeping it under good control and she will continue to keep it under control so as not to damage the

## 2024-04-30 NOTE — PROGRESS NOTES
Isabell Maxwell presents today for   Chief Complaint   Patient presents with    Lower Back Pain       Is someone accompanying this pt? no    Is the patient using any DME equipment during OV? Yes, cane       Coordination of Care:  1. Have you been to the ER, urgent care clinic since your last visit? no  Hospitalized since your last visit? no    2. Have you seen or consulted any other health care providers outside of the [facility] since your last visit? Yes, eye doctor Include any pap smears or colon screening. no    Last  Checked 04/30/24  Last UDS Checked 10/18/23, due today  Last Pain Agreement Signed -due today

## 2024-07-22 DIAGNOSIS — M54.41 CHRONIC BILATERAL LOW BACK PAIN WITH BILATERAL SCIATICA: ICD-10-CM

## 2024-07-22 DIAGNOSIS — G89.29 CHRONIC BILATERAL LOW BACK PAIN WITH BILATERAL SCIATICA: ICD-10-CM

## 2024-07-22 DIAGNOSIS — M43.16 SPONDYLOLISTHESIS, LUMBAR REGION: ICD-10-CM

## 2024-07-22 DIAGNOSIS — M54.42 CHRONIC BILATERAL LOW BACK PAIN WITH BILATERAL SCIATICA: ICD-10-CM

## 2024-07-23 RX ORDER — PREGABALIN 150 MG/1
CAPSULE ORAL
Qty: 60 CAPSULE | Refills: 0 | Status: SHIPPED | OUTPATIENT
Start: 2024-07-23 | End: 2024-08-22

## 2024-07-29 DIAGNOSIS — M43.16 SPONDYLOLISTHESIS, LUMBAR REGION: ICD-10-CM

## 2024-07-29 DIAGNOSIS — G89.29 CHRONIC RIGHT-SIDED LOW BACK PAIN WITH RIGHT-SIDED SCIATICA: ICD-10-CM

## 2024-07-29 DIAGNOSIS — G89.29 CHRONIC BILATERAL LOW BACK PAIN WITH BILATERAL SCIATICA: ICD-10-CM

## 2024-07-29 DIAGNOSIS — M54.42 CHRONIC BILATERAL LOW BACK PAIN WITH BILATERAL SCIATICA: ICD-10-CM

## 2024-07-29 DIAGNOSIS — M54.41 CHRONIC RIGHT-SIDED LOW BACK PAIN WITH RIGHT-SIDED SCIATICA: ICD-10-CM

## 2024-07-29 DIAGNOSIS — M54.41 CHRONIC BILATERAL LOW BACK PAIN WITH BILATERAL SCIATICA: ICD-10-CM

## 2024-07-29 RX ORDER — PREGABALIN 150 MG/1
150 CAPSULE ORAL 2 TIMES DAILY
Qty: 60 CAPSULE | Refills: 2 | Status: SHIPPED | OUTPATIENT
Start: 2024-07-29 | End: 2024-10-27

## 2024-08-02 DIAGNOSIS — G89.29 CHRONIC BILATERAL LOW BACK PAIN WITH BILATERAL SCIATICA: ICD-10-CM

## 2024-08-02 DIAGNOSIS — M54.42 CHRONIC BILATERAL LOW BACK PAIN WITH BILATERAL SCIATICA: ICD-10-CM

## 2024-08-02 DIAGNOSIS — M43.16 SPONDYLOLISTHESIS, LUMBAR REGION: ICD-10-CM

## 2024-08-02 DIAGNOSIS — M54.41 CHRONIC BILATERAL LOW BACK PAIN WITH BILATERAL SCIATICA: ICD-10-CM

## 2024-08-04 RX ORDER — TRAMADOL HYDROCHLORIDE 50 MG/1
50 TABLET ORAL 2 TIMES DAILY PRN
Qty: 60 TABLET | Refills: 0 | Status: SHIPPED | OUTPATIENT
Start: 2024-08-04 | End: 2024-09-03

## 2024-08-29 ENCOUNTER — OFFICE VISIT (OUTPATIENT)
Age: 79
End: 2024-08-29
Payer: MEDICARE

## 2024-08-29 VITALS
BODY MASS INDEX: 33.82 KG/M2 | TEMPERATURE: 98.6 F | OXYGEN SATURATION: 95 % | WEIGHT: 203 LBS | RESPIRATION RATE: 18 BRPM | HEART RATE: 89 BPM | HEIGHT: 65 IN

## 2024-08-29 DIAGNOSIS — M54.41 CHRONIC BILATERAL LOW BACK PAIN WITH BILATERAL SCIATICA: ICD-10-CM

## 2024-08-29 DIAGNOSIS — G89.29 CHRONIC BILATERAL LOW BACK PAIN WITH BILATERAL SCIATICA: ICD-10-CM

## 2024-08-29 DIAGNOSIS — M54.42 CHRONIC BILATERAL LOW BACK PAIN WITH BILATERAL SCIATICA: ICD-10-CM

## 2024-08-29 DIAGNOSIS — M43.16 SPONDYLOLISTHESIS, LUMBAR REGION: ICD-10-CM

## 2024-08-29 PROCEDURE — 1123F ACP DISCUSS/DSCN MKR DOCD: CPT | Performed by: NURSE PRACTITIONER

## 2024-08-29 PROCEDURE — 1090F PRES/ABSN URINE INCON ASSESS: CPT | Performed by: NURSE PRACTITIONER

## 2024-08-29 PROCEDURE — 99214 OFFICE O/P EST MOD 30 MIN: CPT | Performed by: NURSE PRACTITIONER

## 2024-08-29 PROCEDURE — G8399 PT W/DXA RESULTS DOCUMENT: HCPCS | Performed by: NURSE PRACTITIONER

## 2024-08-29 PROCEDURE — G8427 DOCREV CUR MEDS BY ELIG CLIN: HCPCS | Performed by: NURSE PRACTITIONER

## 2024-08-29 PROCEDURE — G8417 CALC BMI ABV UP PARAM F/U: HCPCS | Performed by: NURSE PRACTITIONER

## 2024-08-29 PROCEDURE — 1036F TOBACCO NON-USER: CPT | Performed by: NURSE PRACTITIONER

## 2024-08-29 RX ORDER — TRAMADOL HYDROCHLORIDE 50 MG/1
50 TABLET ORAL 2 TIMES DAILY PRN
Qty: 60 TABLET | Refills: 2 | Status: SHIPPED | OUTPATIENT
Start: 2024-09-04 | End: 2024-12-03

## 2024-08-29 RX ORDER — PREGABALIN 150 MG/1
150 CAPSULE ORAL 2 TIMES DAILY
Qty: 60 CAPSULE | Refills: 1 | Status: SHIPPED | OUTPATIENT
Start: 2024-09-29 | End: 2024-11-28

## 2024-08-29 RX ORDER — SPIRONOLACTONE 25 MG/1
TABLET ORAL
COMMUNITY
Start: 2024-08-20

## 2024-08-29 NOTE — PROGRESS NOTES
Isabell Maxwell presents today for   Chief Complaint   Patient presents with    Back Problem    Pain    Back Pain       Is someone accompanying this pt? no    Is the patient using any DME equipment during OV? no    Depression Screening:       No data to display                Learning Assessment:  Failed to redirect to the Timeline version of the [employer] SmartLink.    Abuse Screening:       No data to display                Fall Risk  Failed to redirect to the Timeline version of the [employer] SmartLink.    OPIOID RISK TOOL  Failed to redirect to the Timeline version of the [employer] SmartLink.    Coordination of Care:  1. Have you been to the ER, urgent care clinic since your last visit? no  Hospitalized since your last visit? no    2. Have you seen or consulted any other health care providers outside of the [facility] since your last visit? no Include any pap smears or colon screening. no

## 2024-08-29 NOTE — PROGRESS NOTES
(swelling)      telmisartan (MICARDIS) 80 MG tablet Take 1 tablet by mouth daily      celecoxib (CELEBREX) 200 MG capsule TAKE 1 CAPSULE BY MOUTH TWICE DAILY (Patient taking differently: Take 1 capsule by mouth 2 times daily as needed for Pain) 120 capsule 2    empagliflozin (JARDIANCE) 10 MG tablet Take 1 tablet by mouth daily      ergocalciferol (ERGOCALCIFEROL) 1.25 MG (35227 UT) capsule TAKE 1 CAPSULE BY MOUTH ONCE WEEKLY      metoprolol succinate (TOPROL XL) 50 MG extended release tablet Take 1 tablet by mouth daily      rosuvastatin (CRESTOR) 5 MG tablet Take 1 tablet by mouth daily      telmisartan-hydroCHLOROthiazide (MICARDIS HCT) 80-12.5 MG per tablet TAKE 1 TABLET BY MOUTH EVERY DAY      spironolactone (ALDACTONE) 25 MG tablet  (Patient not taking: Reported on 8/29/2024)       No current facility-administered medications for this visit.         Review of systems:    Past Medical History:   Diagnosis Date    Diabetes (HCC)     type 2    Hypercholesteremia     Hypertension     Primary osteoarthritis of knees, bilateral     Spondylolisthesis of lumbar region      Past Surgical History:   Procedure Laterality Date    TUBAL LIGATION       Social History     Socioeconomic History    Marital status:      Spouse name: Not on file    Number of children: Not on file    Years of education: Not on file    Highest education level: Not on file   Occupational History    Not on file   Tobacco Use    Smoking status: Never    Smokeless tobacco: Never   Substance and Sexual Activity    Alcohol use: Not on file    Drug use: Not on file    Sexual activity: Not on file   Other Topics Concern    Not on file   Social History Narrative    Not on file     Social Determinants of Health     Financial Resource Strain: Not on file   Food Insecurity: Not on file   Transportation Needs: Not on file   Physical Activity: Not on file   Stress: Not on file   Social Connections: Not on file   Intimate Partner Violence: Not on file    Housing Stability: Not on file     No family history on file.    Physical Exam:  Pulse 89   Temp 98.6 °F (37 °C) (Oral)   Resp 18   Ht 1.651 m (5' 5\")   Wt 92.1 kg (203 lb)   SpO2 95%   BMI 33.78 kg/m²   Pain Scale: 3/10      We have informed Isabell Maxwell to notify us for immediate appointment if she has any worsening neurogical symptoms or if an emergency situation presents, then call 911      Please note that this dictation was completed with [employer] Display System, the [employer] voice recognition software.  Quite often unanticipated grammatical, syntax, homophones, and other interpretive errors are inadvertently transcribed by the computer software.  Please disregard these errors.  Please excuse any errors that have escaped final proofreading.     ROYAL Chen - NP.

## 2024-11-01 RX ORDER — CELECOXIB 200 MG/1
CAPSULE ORAL
Qty: 120 CAPSULE | Refills: 2 | Status: SHIPPED | OUTPATIENT
Start: 2024-11-01

## 2024-11-04 ENCOUNTER — TELEPHONE (OUTPATIENT)
Age: 79
End: 2024-11-04

## 2024-11-04 NOTE — TELEPHONE ENCOUNTER
Called patient identified by two verifiers. Explained    She has refill on the last rx that she has not filled yet.    She verbalized understanding and will call the pharmacy. Thanked for the call

## 2024-11-04 NOTE — TELEPHONE ENCOUNTER
Patient called and is asking for a refill on the Pregabalin medication from [facility].    [facility] pharmacy on [address]   Tel. [phone]    Patient tel. [phone].    Note : patient last seen on 8/29/24 by RYAN Pearce.

## 2024-11-06 ENCOUNTER — OFFICE VISIT (OUTPATIENT)
Age: 79
End: 2024-11-06
Payer: MEDICARE

## 2024-11-06 VITALS
HEIGHT: 65 IN | SYSTOLIC BLOOD PRESSURE: 133 MMHG | WEIGHT: 200 LBS | TEMPERATURE: 97.9 F | BODY MASS INDEX: 33.32 KG/M2 | OXYGEN SATURATION: 96 % | DIASTOLIC BLOOD PRESSURE: 75 MMHG | HEART RATE: 65 BPM

## 2024-11-06 DIAGNOSIS — G89.29 CHRONIC BILATERAL LOW BACK PAIN WITH BILATERAL SCIATICA: Primary | ICD-10-CM

## 2024-11-06 DIAGNOSIS — M54.41 CHRONIC BILATERAL LOW BACK PAIN WITH BILATERAL SCIATICA: Primary | ICD-10-CM

## 2024-11-06 DIAGNOSIS — M43.16 SPONDYLOLISTHESIS, LUMBAR REGION: ICD-10-CM

## 2024-11-06 DIAGNOSIS — Z79.891 ENCOUNTER FOR LONG-TERM USE OF OPIATE ANALGESIC: ICD-10-CM

## 2024-11-06 DIAGNOSIS — M54.42 CHRONIC BILATERAL LOW BACK PAIN WITH BILATERAL SCIATICA: Primary | ICD-10-CM

## 2024-11-06 PROCEDURE — 1036F TOBACCO NON-USER: CPT | Performed by: NURSE PRACTITIONER

## 2024-11-06 PROCEDURE — G8427 DOCREV CUR MEDS BY ELIG CLIN: HCPCS | Performed by: NURSE PRACTITIONER

## 2024-11-06 PROCEDURE — G8484 FLU IMMUNIZE NO ADMIN: HCPCS | Performed by: NURSE PRACTITIONER

## 2024-11-06 PROCEDURE — 1090F PRES/ABSN URINE INCON ASSESS: CPT | Performed by: NURSE PRACTITIONER

## 2024-11-06 PROCEDURE — 1123F ACP DISCUSS/DSCN MKR DOCD: CPT | Performed by: NURSE PRACTITIONER

## 2024-11-06 PROCEDURE — 99214 OFFICE O/P EST MOD 30 MIN: CPT | Performed by: NURSE PRACTITIONER

## 2024-11-06 PROCEDURE — 1159F MED LIST DOCD IN RCRD: CPT | Performed by: NURSE PRACTITIONER

## 2024-11-06 PROCEDURE — G8417 CALC BMI ABV UP PARAM F/U: HCPCS | Performed by: NURSE PRACTITIONER

## 2024-11-06 PROCEDURE — 1125F AMNT PAIN NOTED PAIN PRSNT: CPT | Performed by: NURSE PRACTITIONER

## 2024-11-06 PROCEDURE — G8399 PT W/DXA RESULTS DOCUMENT: HCPCS | Performed by: NURSE PRACTITIONER

## 2024-11-06 PROCEDURE — 1160F RVW MEDS BY RX/DR IN RCRD: CPT | Performed by: NURSE PRACTITIONER

## 2024-11-06 RX ORDER — PREGABALIN 150 MG/1
150 CAPSULE ORAL 2 TIMES DAILY
Qty: 60 CAPSULE | Refills: 5 | Status: SHIPPED | OUTPATIENT
Start: 2024-12-02 | End: 2025-05-31

## 2024-11-06 RX ORDER — TRAMADOL HYDROCHLORIDE 50 MG/1
50 TABLET ORAL 2 TIMES DAILY PRN
Qty: 60 TABLET | Refills: 0 | Status: SHIPPED | OUTPATIENT
Start: 2024-12-01 | End: 2025-03-01

## 2024-11-06 NOTE — PROGRESS NOTES
Isabell Maxwell presents today for   Chief Complaint   Patient presents with    Lower Back Pain       Is someone accompanying this pt? no    Is the patient using any DME equipment during OV? Yes, cane     Coordination of Care:  1. Have you been to the ER, urgent care clinic since your last visit? no  Hospitalized since your last visit? no    2. Have you seen or consulted any other health care providers outside of the [facility] since your last visit? Yes, podiatry Include any pap smears or colon screening. no    Last  Checked 11/06/24   Last UDS Checked 04/30/24- due today  Last Pain Agreement Signed 04/30/24    
   Drug Screen with Reflex to Quantitation (Not Interfaced)  -     pregabalin (LYRICA) 150 MG capsule; Take 1 capsule by mouth 2 times daily for 180 days. Max Daily Amount: 300 mg  -     traMADol (ULTRAM) 50 MG tablet; Take 1 tablet by mouth 2 times daily as needed for Pain for up to 90 days. Max Daily Amount: 100 mg    Spondylolisthesis, lumbar region  -     pregabalin (LYRICA) 150 MG capsule; Take 1 capsule by mouth 2 times daily for 180 days. Max Daily Amount: 300 mg  -     traMADol (ULTRAM) 50 MG tablet; Take 1 tablet by mouth 2 times daily as needed for Pain for up to 90 days. Max Daily Amount: 100 mg    Encounter for long-term use of opiate analgesic  -     Drug Screen with Reflex to Quantitation (Not Interfaced)          No follow-ups on file.       Least pain over the last week has been 0/10.  Worst pain over the last week has been 7/10.  Opioid Risk Tool Reviewed: YES, Score 0  Aberrant behaviors: None.    Urine Drug Screen: today  Controlled substance agreement on file: Yes.     reviewed:yes  Pill count is consistent with his prescription: n/a  Concomitant use of a benzodiazepine: No  MME 20  Narcan not warranted         Risks and benefits of ongoing opiate therapy have been reviewed with the patient.  No pain behaviors. Denies thoughts of harming self or others. Pt has a good risk to benefit ratio which allows the pt to function in a home environment without side effects        has been .reviewed and is appropriate    Pt has a consistent UDS in the record  Medications  Current Outpatient Medications   Medication Sig Dispense Refill    [START ON 12/2/2024] pregabalin (LYRICA) 150 MG capsule Take 1 capsule by mouth 2 times daily for 180 days. Max Daily Amount: 300 mg 60 capsule 5    [START ON 12/1/2024] traMADol (ULTRAM) 50 MG tablet Take 1 tablet by mouth 2 times daily as needed for Pain for up to 90 days. Max Daily Amount: 100 mg 60 tablet 0    celecoxib (CELEBREX) 200 MG capsule TAKE 1 CAPSULE BY

## 2024-11-21 DIAGNOSIS — M54.42 CHRONIC BILATERAL LOW BACK PAIN WITH BILATERAL SCIATICA: ICD-10-CM

## 2024-11-21 DIAGNOSIS — M43.16 SPONDYLOLISTHESIS, LUMBAR REGION: ICD-10-CM

## 2024-11-21 DIAGNOSIS — G89.29 CHRONIC BILATERAL LOW BACK PAIN WITH BILATERAL SCIATICA: ICD-10-CM

## 2024-11-21 DIAGNOSIS — M54.41 CHRONIC BILATERAL LOW BACK PAIN WITH BILATERAL SCIATICA: ICD-10-CM

## 2024-11-21 NOTE — TELEPHONE ENCOUNTER
The pt's UDS results were consistent. A  was obtained and there were no aberrancies noted. The last fill date for tramadol was 11/02/24. A prescription for tramadol has been pended for review. Her next follow up is scheduled for 02/04/24. There is already an Rx for tramadol at the pharmacy to fill on 12/01/24.

## 2024-11-22 RX ORDER — TRAMADOL HYDROCHLORIDE 50 MG/1
50 TABLET ORAL 2 TIMES DAILY PRN
Qty: 60 TABLET | Refills: 1 | Status: SHIPPED | OUTPATIENT
Start: 2024-12-30 | End: 2025-03-30

## 2025-01-13 ENCOUNTER — TELEPHONE (OUTPATIENT)
Age: 80
End: 2025-01-13

## 2025-01-13 NOTE — TELEPHONE ENCOUNTER
A chart review shows that a refill was sent for the pt back in November with 5 refills. Prescription was dated to fill in December. I called and confirmed with [facility] that they have the new prescription. The pharmacist is putting the refill in now for the pt. I called and spoke to Ms. Maxwell. The pt was identified using 2 pt identifiers. She was notified of this and verbalized understanding. I also let pt know that there will be 5 refills on this prescription.

## 2025-01-13 NOTE — TELEPHONE ENCOUNTER
Patient is requesting a refill for pregabalin (LYRICA) 150 MG capsule     Patient tel [phone]    [id number] - [facility] [address] [place] [address] W - P [phone] - F [phone]

## 2025-02-04 ENCOUNTER — OFFICE VISIT (OUTPATIENT)
Age: 80
End: 2025-02-04
Payer: MEDICARE

## 2025-02-04 VITALS
HEART RATE: 72 BPM | BODY MASS INDEX: 33.32 KG/M2 | TEMPERATURE: 98.2 F | RESPIRATION RATE: 18 BRPM | OXYGEN SATURATION: 96 % | HEIGHT: 65 IN | WEIGHT: 200 LBS

## 2025-02-04 DIAGNOSIS — G89.29 CHRONIC BILATERAL LOW BACK PAIN WITH BILATERAL SCIATICA: ICD-10-CM

## 2025-02-04 DIAGNOSIS — M43.16 SPONDYLOLISTHESIS, LUMBAR REGION: Primary | ICD-10-CM

## 2025-02-04 DIAGNOSIS — M54.42 CHRONIC BILATERAL LOW BACK PAIN WITH BILATERAL SCIATICA: ICD-10-CM

## 2025-02-04 DIAGNOSIS — M54.41 CHRONIC BILATERAL LOW BACK PAIN WITH BILATERAL SCIATICA: ICD-10-CM

## 2025-02-04 PROCEDURE — 1126F AMNT PAIN NOTED NONE PRSNT: CPT | Performed by: NURSE PRACTITIONER

## 2025-02-04 PROCEDURE — 1159F MED LIST DOCD IN RCRD: CPT | Performed by: NURSE PRACTITIONER

## 2025-02-04 PROCEDURE — G8427 DOCREV CUR MEDS BY ELIG CLIN: HCPCS | Performed by: NURSE PRACTITIONER

## 2025-02-04 PROCEDURE — 1090F PRES/ABSN URINE INCON ASSESS: CPT | Performed by: NURSE PRACTITIONER

## 2025-02-04 PROCEDURE — 1160F RVW MEDS BY RX/DR IN RCRD: CPT | Performed by: NURSE PRACTITIONER

## 2025-02-04 PROCEDURE — 1123F ACP DISCUSS/DSCN MKR DOCD: CPT | Performed by: NURSE PRACTITIONER

## 2025-02-04 PROCEDURE — G8399 PT W/DXA RESULTS DOCUMENT: HCPCS | Performed by: NURSE PRACTITIONER

## 2025-02-04 PROCEDURE — 99214 OFFICE O/P EST MOD 30 MIN: CPT | Performed by: NURSE PRACTITIONER

## 2025-02-04 PROCEDURE — G8417 CALC BMI ABV UP PARAM F/U: HCPCS | Performed by: NURSE PRACTITIONER

## 2025-02-04 PROCEDURE — 1036F TOBACCO NON-USER: CPT | Performed by: NURSE PRACTITIONER

## 2025-02-04 RX ORDER — SPIRONOLACTONE 25 MG/1
TABLET ORAL
COMMUNITY
Start: 2025-01-27

## 2025-02-04 RX ORDER — TRAMADOL HYDROCHLORIDE 50 MG/1
50 TABLET ORAL 2 TIMES DAILY PRN
Qty: 60 TABLET | Refills: 2 | Status: SHIPPED | OUTPATIENT
Start: 2025-02-04 | End: 2025-05-05

## 2025-02-04 NOTE — PROGRESS NOTES
Isabell Maxwell presents today for   Chief Complaint   Patient presents with    Back Problem    Pain    Back Pain       Is someone accompanying this pt? no    Is the patient using any DME equipment during OV? no    Depression Screening:       No data to display                Learning Assessment:  Failed to redirect to the Timeline version of the [employer] SmartLink.    Abuse Screening:       No data to display                Fall Risk  Failed to redirect to the Timeline version of the [employer] SmartLink.    OPIOID RISK TOOL  Failed to redirect to the Timeline version of the [employer] SmartLink.    Coordination of Care:  1. Have you been to the ER, urgent care clinic since your last visit? no  Hospitalized since your last visit? no    2. Have you seen or consulted any other health care providers outside of the [facility] since your last visit? no Include any pap smears or colon screening. no

## 2025-02-04 NOTE — PROGRESS NOTES
Chief complaint   Chief Complaint   Patient presents with    Back Problem    Pain    Back Pain       History of Present Illness:  Isabell Maxwell is a  79 y.o.  female who comes in today for follow-up of her low back pain with bilateral buttock pain.  She is on Lyrica  150 mg twice a day.  She states it does help with her leg pain.  On the left the pain will go down into her buttock and on the right down to her calf. For her back pain she takes tramadol 50 mg twice a day.  She states that is very helpful and it and it brings her pain from a 7 down to a 0-1 out of 10, but she states she has been unable to get it filled at the drugstore.  The last time she had filled was November 2.  She has been out for a couple months now.  Neck pain has gotten worse without it.  She has a known spondylolisthesis at L4-5 and does not wish to have any surgery.  She is diabetic and states her blood sugars are controlled.  She is using a single-point cane to ambulate but feels like she needs something little more to help her with her ambulation.  She denies fever bowel bladder dysfunction.       Physical Exam: The patient is a 79-year-old female well-developed well-nourished who is alert and oriented with a normal mood and affect.  She has a slow but full weightbearing nonantalgic gait.  She is using a single-point cane.    She has 4 out of 5 strength bilateral lower extremities.  Negative straight leg raise.  She has pain with hyperextension lumbar spine.     Assessment & Plan: This is a patient has a spondylolisthesis at L4-5 that gives her chronic back and leg pain.   She is doing very well on Lyrica 150 twice a day.  She does not need a refill at this time.  I canceled out her old prescription refills for her tramadol and put in a new 1.  She is barbara let us know if she has any trouble getting that filled.  We did talk about her diabetes and keeping her blood sugar controlled so as not to cause any nerve damage.  I also showed her an

## 2025-03-09 NOTE — PROGRESS NOTES
Dr Hale at bedside  
Isabell Maxwell presents today for   Chief Complaint   Patient presents with    Back Problem    Pain    Back Pain       Is someone accompanying this pt? no    Is the patient using any DME equipment during OV? no    Depression Screening:       No data to display                Learning Assessment:      Abuse Screening:       No data to display                Fall Risk      OPIOID RISK TOOL      Coordination of Care:  1. Have you been to the ER, urgent care clinic since your last visit? no  Hospitalized since your last visit? no    2. Have you seen or consulted any other health care providers outside of the [facility] since your last visit? no Include any pap smears or colon screening. no      
capsule 5    celecoxib (CELEBREX) 200 MG capsule TAKE 1 CAPSULE BY MOUTH TWICE DAILY 120 capsule 2    empagliflozin (JARDIANCE) 10 MG tablet Take 1 tablet by mouth daily      ergocalciferol (ERGOCALCIFEROL) 1.25 MG (43833 UT) capsule TAKE 1 CAPSULE BY MOUTH ONCE WEEKLY      metoprolol succinate (TOPROL XL) 50 MG extended release tablet Take 1 tablet by mouth daily      rosuvastatin (CRESTOR) 5 MG tablet Take 1 tablet by mouth daily      telmisartan-hydroCHLOROthiazide (MICARDIS HCT) 80-12.5 MG per tablet TAKE 1 TABLET BY MOUTH EVERY DAY       No current facility-administered medications for this visit.         Review of systems:    Past Medical History:   Diagnosis Date    Hypertension      Past Surgical History:   Procedure Laterality Date    TUBAL LIGATION       Social History     Socioeconomic History    Marital status:      Spouse name: Not on file    Number of children: Not on file    Years of education: Not on file    Highest education level: Not on file   Occupational History    Not on file   Tobacco Use    Smoking status: Never    Smokeless tobacco: Never   Substance and Sexual Activity    Alcohol use: Not on file    Drug use: Not on file    Sexual activity: Not on file   Other Topics Concern    Not on file   Social History Narrative    Not on file     Social Determinants of Health     Financial Resource Strain: Not on file   Food Insecurity: Not on file   Transportation Needs: Not on file   Physical Activity: Not on file   Stress: Not on file   Social Connections: Not on file   Intimate Partner Violence: Not on file   Housing Stability: Not on file     No family history on file.    Physical Exam:  Pulse 80   Temp 98.1 °F (36.7 °C) (Oral)   Resp 18   Ht 1.651 m (5' 5\")   Wt 95.3 kg (210 lb 3.2 oz)   SpO2 96% Comment: RA  BMI 34.98 kg/m²   Pain Scale: 5/10      We have informed Isabell Maxwell to notify us for immediate appointment if she has any worsening neurogical symptoms or if an emergency